# Patient Record
Sex: MALE | Race: WHITE | NOT HISPANIC OR LATINO | Employment: FULL TIME | URBAN - METROPOLITAN AREA
[De-identification: names, ages, dates, MRNs, and addresses within clinical notes are randomized per-mention and may not be internally consistent; named-entity substitution may affect disease eponyms.]

---

## 2022-12-11 ASSESSMENT — PATIENT HEALTH QUESTIONNAIRE - PHQ9
SUM OF ALL RESPONSES TO PHQ QUESTIONS 1-9: 12
SUM OF ALL RESPONSES TO PHQ QUESTIONS 1-9: 12
10. IF YOU CHECKED OFF ANY PROBLEMS, HOW DIFFICULT HAVE THESE PROBLEMS MADE IT FOR YOU TO DO YOUR WORK, TAKE CARE OF THINGS AT HOME, OR GET ALONG WITH OTHER PEOPLE: NOT DIFFICULT AT ALL

## 2022-12-12 ENCOUNTER — OFFICE VISIT (OUTPATIENT)
Dept: FAMILY MEDICINE | Facility: CLINIC | Age: 37
End: 2022-12-12
Payer: COMMERCIAL

## 2022-12-12 VITALS
OXYGEN SATURATION: 99 % | BODY MASS INDEX: 40.93 KG/M2 | WEIGHT: 260.8 LBS | HEART RATE: 57 BPM | DIASTOLIC BLOOD PRESSURE: 83 MMHG | SYSTOLIC BLOOD PRESSURE: 130 MMHG | HEIGHT: 67 IN | RESPIRATION RATE: 14 BRPM | TEMPERATURE: 97.5 F

## 2022-12-12 DIAGNOSIS — R20.0 NUMBNESS OF LEFT FOOT: ICD-10-CM

## 2022-12-12 DIAGNOSIS — L40.9 PSORIASIS: ICD-10-CM

## 2022-12-12 DIAGNOSIS — Z13.1 SCREENING FOR DIABETES MELLITUS: ICD-10-CM

## 2022-12-12 DIAGNOSIS — E78.5 HYPERLIPIDEMIA LDL GOAL <100: ICD-10-CM

## 2022-12-12 DIAGNOSIS — F32.1 CURRENT MODERATE EPISODE OF MAJOR DEPRESSIVE DISORDER WITHOUT PRIOR EPISODE (H): ICD-10-CM

## 2022-12-12 DIAGNOSIS — E66.01 MORBID OBESITY (H): ICD-10-CM

## 2022-12-12 DIAGNOSIS — Z00.00 ROUTINE GENERAL MEDICAL EXAMINATION AT A HEALTH CARE FACILITY: Primary | ICD-10-CM

## 2022-12-12 PROBLEM — M54.9 BACKACHE: Status: RESOLVED | Noted: 2019-10-17 | Resolved: 2022-12-12

## 2022-12-12 PROBLEM — N52.9 ERECTILE DYSFUNCTION: Status: ACTIVE | Noted: 2022-12-12

## 2022-12-12 PROBLEM — F32.9 MAJOR DEPRESSION: Status: ACTIVE | Noted: 2022-12-12

## 2022-12-12 PROBLEM — M54.16 LUMBAR RADICULOPATHY: Status: ACTIVE | Noted: 2019-11-08

## 2022-12-12 PROBLEM — N52.9 ERECTILE DYSFUNCTION: Status: RESOLVED | Noted: 2022-12-12 | Resolved: 2022-12-12

## 2022-12-12 PROBLEM — M54.9 BACKACHE: Status: ACTIVE | Noted: 2019-10-17

## 2022-12-12 LAB
CHOLEST SERPL-MCNC: 284 MG/DL
FASTING STATUS PATIENT QL REPORTED: YES
GLUCOSE SERPL-MCNC: 85 MG/DL (ref 70–99)
HDLC SERPL-MCNC: 40 MG/DL
LDLC SERPL CALC-MCNC: 220 MG/DL
NONHDLC SERPL-MCNC: 244 MG/DL
TRIGL SERPL-MCNC: 121 MG/DL

## 2022-12-12 PROCEDURE — 90686 IIV4 VACC NO PRSV 0.5 ML IM: CPT | Performed by: FAMILY MEDICINE

## 2022-12-12 PROCEDURE — 90471 IMMUNIZATION ADMIN: CPT | Performed by: FAMILY MEDICINE

## 2022-12-12 PROCEDURE — 90472 IMMUNIZATION ADMIN EACH ADD: CPT | Performed by: FAMILY MEDICINE

## 2022-12-12 PROCEDURE — 90715 TDAP VACCINE 7 YRS/> IM: CPT | Performed by: FAMILY MEDICINE

## 2022-12-12 PROCEDURE — 99385 PREV VISIT NEW AGE 18-39: CPT | Mod: 25 | Performed by: FAMILY MEDICINE

## 2022-12-12 PROCEDURE — 82947 ASSAY GLUCOSE BLOOD QUANT: CPT | Performed by: FAMILY MEDICINE

## 2022-12-12 PROCEDURE — 36415 COLL VENOUS BLD VENIPUNCTURE: CPT | Performed by: FAMILY MEDICINE

## 2022-12-12 PROCEDURE — 80061 LIPID PANEL: CPT | Performed by: FAMILY MEDICINE

## 2022-12-12 RX ORDER — GABAPENTIN 300 MG/1
300 CAPSULE ORAL 3 TIMES DAILY
COMMUNITY
Start: 2022-01-06 | End: 2022-12-12

## 2022-12-12 RX ORDER — CLONIDINE HYDROCHLORIDE 0.1 MG/1
0.1 TABLET ORAL 2 TIMES DAILY
COMMUNITY
Start: 2022-11-30

## 2022-12-12 RX ORDER — CLOBETASOL PROPIONATE 0.5 MG/G
OINTMENT TOPICAL
COMMUNITY
Start: 2022-04-18 | End: 2022-12-12

## 2022-12-12 RX ORDER — SERTRALINE HYDROCHLORIDE 100 MG/1
100 TABLET, FILM COATED ORAL DAILY
COMMUNITY
Start: 2022-11-30 | End: 2023-07-19

## 2022-12-12 RX ORDER — CLOBETASOL PROPIONATE 0.5 MG/G
OINTMENT TOPICAL
Qty: 60 G | Refills: 11 | Status: SHIPPED | OUTPATIENT
Start: 2022-12-12 | End: 2023-12-21

## 2022-12-12 ASSESSMENT — PATIENT HEALTH QUESTIONNAIRE - PHQ9
SUM OF ALL RESPONSES TO PHQ QUESTIONS 1-9: 12
10. IF YOU CHECKED OFF ANY PROBLEMS, HOW DIFFICULT HAVE THESE PROBLEMS MADE IT FOR YOU TO DO YOUR WORK, TAKE CARE OF THINGS AT HOME, OR GET ALONG WITH OTHER PEOPLE: NOT DIFFICULT AT ALL

## 2022-12-12 NOTE — PROGRESS NOTES
"Assessment & Plan   Screening for HIV (human immunodeficiency virus)  ***    Need for hepatitis C screening test  ***    Screening for hyperlipidemia  ***      BMI:   Estimated body mass index is 40.85 kg/m  as calculated from the following:    Height as of this encounter: 1.702 m (5' 7\").    Weight as of this encounter: 118.3 kg (260 lb 12.8 oz).   {Weight Management Plan needed for ACO:391698}    Depression Screening Follow Up    PHQ 12/11/2022   PHQ-9 Total Score 12   Q9: Thoughts of better off dead/self-harm past 2 weeks Not at all     {Last PHQ9 Response (Optional):619294}    Follow Up Actions Taken  {Positive screening follow up actions:778677::\"Crisis resource information provided in After Visit Summary\"}       No follow-ups on file.      Lenin Lr MD      [address] [address]  [email or web address]   Office: [phone]         Lamont Briscoe is a 37 year old, presenting for the following health issues:  Establish Care, RECHECK (From spinal surgery last year.), and Recheck Medication      History of Present Illness       Reason for visit:  Establish care + follow up from spine surgery    He eats 0-1 servings of fruits and vegetables daily.He consumes 0 sweetened beverage(s) daily.He exercises with enough effort to increase his heart rate 9 or less minutes per day.  He exercises with enough effort to increase his heart rate 3 or less days per week.   He is taking medications regularly.    Today's PHQ-9         PHQ-9 Total Score: 12    PHQ-9 Q9 Thoughts of better off dead/self-harm past 2 weeks :   Not at all    How difficult have these problems made it for you to do your work, take care of things at home, or get along with other people: Not difficult at all     Review of Systems   Constitutional, HEENT, cardiovascular, pulmonary, gi and gu systems are negative, except as otherwise noted.      Objective    /83   Pulse 57   Temp 97.5  F (36.4  C) " "(Tympanic)   Resp 14   Ht 1.702 m (5' 7\")   Wt 118.3 kg (260 lb 12.8 oz)   SpO2 99%   BMI 40.85 kg/m    Body mass index is 40.85 kg/m .  Physical Exam   GENERAL: healthy, alert and no distress  HENT: ear canals and TM's normal, nose and mouth without ulcers or lesions  NECK: no adenopathy, no asymmetry, masses, or scars and thyroid normal to palpation  RESP: lungs clear to auscultation - no rales, rhonchi or wheezes  CV: regular rate and rhythm, normal S1 S2, no S3 or S4, no murmur, click or rub, no peripheral edema and peripheral pulses strong  ABDOMEN: soft, nontender, no hepatosplenomegaly, no masses and bowel sounds normal  MS: no gross musculoskeletal defects noted, no edema  SKIN: no suspicious lesions or rashes  NEURO: Normal strength and tone, mentation intact and speech normal  BACK: no CVA tenderness, no paralumbar tenderness  PSYCH: mentation appears normal, affect normal/bright  LYMPH: no cervical, supraclavicular, axillary, or inguinal adenopathy                  "

## 2022-12-12 NOTE — PROGRESS NOTES
SUBJECTIVE:   CC: Juan Burleson is an 37 year old male who presents for preventive health visit.     Patient has been advised of split billing requirements and indicates understanding: Yes  History of Present Illness       Reason for visit:  Establish care + follow up from spine surgery    He eats 0-1 servings of fruits and vegetables daily.He consumes 0 sweetened beverage(s) daily.He exercises with enough effort to increase his heart rate 9 or less minutes per day.  He exercises with enough effort to increase his heart rate 3 or less days per week.   He is taking medications regularly.    Today's PHQ-9         PHQ-9 Total Score: 12    PHQ-9 Q9 Thoughts of better off dead/self-harm past 2 weeks :   Not at all    How difficult have these problems made it for you to do your work, take care of things at home, or get along with other people: Not difficult at all      Healthy Habits:    Do you get at least three servings of calcium containing foods daily (dairy, green leafy vegetables, etc.)? yes    Amount of exercise or daily activities, outside of work: 7 day(s) per week - dog walking    Problems taking medications regularly No    Medication side effects: No    Have you had an eye exam in the past two years? no    Do you see a dentist twice per year? yes    Do you have sleep apnea, excessive snoring or daytime drowsiness?no      Depression -  Sees psych    psoriasis on legs. Need oint     Today's PHQ-2 Score:   PHQ-2 ( 1999 [employer]) 12/11/2022   Q1: Little interest or pleasure in doing things 3   Q2: Feeling down, depressed or hopeless 3   PHQ-2 Score 6   Q1: Little interest or pleasure in doing things Nearly every day   Q2: Feeling down, depressed or hopeless Nearly every day   PHQ-2 Score 6       Abuse: Current or Past(Physical, Sexual or Emotional)- No  Do you feel safe in your environment? Yes        Social History     Tobacco Use     Smoking status: Never     Smokeless tobacco: Never   Substance Use Topics      "Alcohol use: Yes     Comment: once or twice a month - have a drink when out to dinner with     If you drink alcohol do you typically have >3 drinks per day or >7 drinks per week? No                      Reviewed orders with patient. Reviewed health maintenance and updated orders accordingly - Yes  Reviewed and updated as needed this visit by clinical staff   Tobacco  Allergies  Meds  Problems  Med Hx  Surg Hx  Fam Hx          Reviewed and updated as needed this visit by Provider   Tobacco  Allergies  Meds  Problems  Med Hx  Surg Hx  Fam Hx           ROS:  Review of Systems   Constitutional, HEENT, cardiovascular, pulmonary, GI, , musculoskeletal, neuro, skin, endocrine and psych systems are negative, except as otherwise noted.  OBJECTIVE:   /83   Pulse 57   Temp 97.5  F (36.4  C) (Tympanic)   Resp 14   Ht 1.702 m (5' 7\")   Wt 118.3 kg (260 lb 12.8 oz)   SpO2 99%   BMI 40.85 kg/m    EXAM:  GENERAL: healthy, alert and no distress  EYES: Eyes grossly normal to inspection, PERRL and conjunctivae and sclerae normal  HENT: ear canals and TM's normal, nose and mouth without ulcers or lesions  NECK: no adenopathy, no asymmetry, masses, or scars and thyroid normal to palpation  RESP: lungs clear to auscultation - no rales, rhonchi or wheezes  BREAST: normal without masses, tenderness or nipple discharge and no palpable axillary masses or adenopathy  CV: regular rate and rhythm, normal S1 S2, no S3 or S4, no murmur, click or rub, no peripheral edema and peripheral pulses strong  ABDOMEN: soft, nontender, no hepatosplenomegaly, no masses and bowel sounds normal   (male): normal male genitalia without lesions or urethral discharge, no hernia  MS: no gross musculoskeletal defects noted, no edema  SKIN: no suspicious lesions or rashes  NEURO: Normal strength and tone, mentation intact and speech normal  PSYCH: mentation appears normal, affect normal/bright  LYMPH: no cervical, supraclavicular, " "axillary, or inguinal adenopathy    ASSESSMENT/PLAN:   Routine general medical examination at a health care facility      Psoriasis  Ongoing symptoms, medication helpful and needs refill:  - clobetasol (TEMOVATE) 0.05 % external ointment  Dispense: 60 g; Refill: 11    Current moderate episode of major depressive disorder without prior episode (H)  Controlled - continue medication(s).    Morbid obesity (H)  Healthy diet and exercise    Screening for diabetes mellitus  - Glucose  - Glucose    Numbness of left foot  Bothersome symptoms and will refer to physical therapy and spine due to persistence.  - Spine  Referral  - Physical Therapy Referral    Hyperlipidemia LDL goal <100  The above noted, diet and exercise recommended recheck in 3 months.  - Lipid panel reflex to direct LDL Fasting  - Lipid panel reflex to direct LDL Non-fasting  - Lipid panel reflex to direct LDL Non-fasti    COUNSELING:  Reviewed preventive health counseling, as reflected in patient instructions    Estimated body mass index is 40.85 kg/m  as calculated from the following:    Height as of this encounter: 1.702 m (5' 7\").    Weight as of this encounter: 118.3 kg (260 lb 12.8 oz).  Weight management plan: Discussed healthy diet and exercise guidelines     reports that he has never smoked. He has never used smokeless tobacco.      Return in about 1 year (around 12/12/2023) for wellness exam with fasting labs with Lenin Lr MD.           Lenin Lr MD     [address] [address]  [email or web address]     Office: [phone]     "

## 2022-12-15 PROBLEM — E78.5 HYPERLIPIDEMIA LDL GOAL <100: Status: ACTIVE | Noted: 2022-12-15

## 2022-12-15 NOTE — RESULT ENCOUNTER NOTE
Dear Luis Felipe,    Here is a summary of your recent test results:  -LDL(bad) cholesterol level is very elevated which can increase your heart disease risk.  A diet high in fat and simple carbohydrates, genetics and being overweight can contribute to this. ADVISE: exercising 150 minutes of aerobic exercise per week (30 minutes for 5 days per week or 50 minutes for 3 days per week are options) and eating a low saturated fat/low carbohydrate diet are helpful to improve this. In 3 months, you should recheck your fasting cholesterol panel by scheduling a lab-only appointment.  -Glucose (diabetic screening test) is normal.    For additional lab test information, [email or web address] is a very good reference.           Thank you very much for trusting me and [facility].     Have a peaceful day.    Healthy regards,  Lenin Lr MD

## 2023-01-05 ENCOUNTER — THERAPY VISIT (OUTPATIENT)
Dept: PHYSICAL THERAPY | Facility: CLINIC | Age: 38
End: 2023-01-05
Attending: FAMILY MEDICINE
Payer: COMMERCIAL

## 2023-01-05 DIAGNOSIS — R20.0 NUMBNESS OF LEFT FOOT: ICD-10-CM

## 2023-01-05 DIAGNOSIS — M99.05 SOMATIC DYSFUNCTION OF PELVIC REGION: ICD-10-CM

## 2023-01-05 PROCEDURE — 97161 PT EVAL LOW COMPLEX 20 MIN: CPT | Mod: GP | Performed by: PHYSICAL THERAPIST

## 2023-01-05 PROCEDURE — 97140 MANUAL THERAPY 1/> REGIONS: CPT | Mod: GP | Performed by: PHYSICAL THERAPIST

## 2023-01-05 PROCEDURE — 97110 THERAPEUTIC EXERCISES: CPT | Mod: GP | Performed by: PHYSICAL THERAPIST

## 2023-01-05 NOTE — PROGRESS NOTES
Physical Therapy Initial Evaluation  Subjective:    Patient Health History  Juan Burleson being seen for hip hike and rotation on left side causing some issues up/down chain. Cannot feel parts of left foot..       Problem occurred: Original injury was in 2011, slight tear of right QL, lead to left side becoming dominant.  Got worse last year and contributed to herniated disc.   Pain is reported as 1/10 on pain scale.  General health as reported by patient is fair.  Pertinent medical history includes: high blood pressure, mental illness, numbness/tingling and overweight.     Medical allergies: none.   Surgeries include:  Orthopedic surgery.    Current medications:  Anti-depressants and other. Other medications details: topical steroid for psoriasis.    Current occupation is .   Primary job tasks include:  Computer work and prolonged sitting.                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 Pt presents with pain, decreased ROM, and instability consistent with pelvic dysfunction and generalized hypermobility. These impairments limit their ability to sit, and walk. Skilled PT services necessary in order to reduce impairments and improve independent function.    Subjective:   Chief Complaint: Pt notes 10 years ago was powerlifting (deadlift) and tore quadratus lumborum, notes left sided dysfunction (pelvis). Did have lumbar disectomy L5-S1 (2022)  DOI/onset: 1/1/2011 DOS: L5-S1 disectomy   Location: left sided pelvis/hip Quality: pinch/ sharp  Frequency: intermittent  Radiates: left sided foot numbness   Pain scale: Rest 0/10 Activity 8/10    Sleeping: occasionally wakes in pain    Exacerbated by: sit, walk  Relieved by: self correction/manip  Progression: same  Previous  Treatment: PT, chiro, massage  Effect of prior treatment: minimal help    PMH and/or surgical history: none    Imaging: Xrays, MRI    Occupation: [employer]  Job duties: computer, sitting     Current HEP/exercise regimen: none currently (use to be competitive power lifting)  Patient's goals: correct pelvis alignment  Medications: please refer to chart   General health as reported by patient: good   Return to MD: as needed  Red Flags: generalized hypermobility                     Objective:  LUMBAR:    Posture: shifted to right in sitting, rounded low back    Neurological:    Motor Deficit:  Myotomes L R   L1-2 (hip flexion) 5/5 5/5   L3 (knee extension) 5/5 5/5   L4 (ankle DF) 5/5 5/5   L5 (g. toe ext) 5/5 5/5   S1 (ankle PF or knee flex) 5/5 5/5     Sensory Deficit, Reflexes: lacking sensation on L5 on left (lateral foot)    Dural Signs:   L R   Slump + -       AROM: (Major, Moderate, Minimal or Nil loss)  Movement Loss Abel Mod Min Nil Pain   Flexion    X No pain   Extension    X Left side end range pinch   Side Gliding L    X End range pinch on left low back   Side Gliding R    X        Other Tests: ASIS on right side superior compared to left  MET:  Left sided glute activation to pull down L pelvis... significant improvement follow MET technique today.    System    Physical Exam    General     ROS    Assessment/Plan:    Patient is a 37 year old male with lumbar/ foot complaints.    Patient has the following significant findings with corresponding treatment plan.                Diagnosis 1:  L pelvic dysfunction  Pain -  hot/cold therapy, manual therapy, self management, education, directional preference exercise and home program  Decreased ROM/flexibility - manual therapy and therapeutic exercise  Decreased joint mobility - manual therapy and therapeutic exercise  Decreased strength - therapeutic exercise and therapeutic activities  Inflammation - cold therapy and self management/home program  Impaired gait - gait  training  Impaired muscle performance - neuro re-education  Decreased function - therapeutic activities  Impaired posture - neuro re-education  Instability -  Therapeutic Activity  Therapeutic Exercise    Therapy Evaluation Codes:     Cumulative Therapy Evaluation is: Low complexity.    Previous and current functional limitations:  (See Goal Flow Sheet for this information)    Short term and Long term goals: (See Goal Flow Sheet for this information)     Communication ability:  Patient appears to be able to clearly communicate and understand verbal and written communication and follow directions correctly.  Treatment Explanation - The following has been discussed with the patient:   RX ordered/plan of care  Anticipated outcomes  Possible risks and side effects  This patient would benefit from PT intervention to resume normal activities.   Rehab potential is good.    Frequency:  1 X week, once daily  Duration:  for 6 weeks  Discharge Plan:  Achieve all LTG.  Independent in home treatment program.  Reach maximal therapeutic benefit.    Please refer to the daily flowsheet for treatment today, total treatment time and time spent performing 1:1 timed codes.

## 2023-01-09 ENCOUNTER — OFFICE VISIT (OUTPATIENT)
Dept: NEUROSURGERY | Facility: CLINIC | Age: 38
End: 2023-01-09
Attending: FAMILY MEDICINE
Payer: COMMERCIAL

## 2023-01-09 VITALS
BODY MASS INDEX: 40.93 KG/M2 | HEIGHT: 67 IN | HEART RATE: 73 BPM | OXYGEN SATURATION: 99 % | DIASTOLIC BLOOD PRESSURE: 84 MMHG | SYSTOLIC BLOOD PRESSURE: 130 MMHG | WEIGHT: 260.8 LBS

## 2023-01-09 DIAGNOSIS — R20.0 NUMBNESS OF LEFT FOOT: ICD-10-CM

## 2023-01-09 PROCEDURE — 99203 OFFICE O/P NEW LOW 30 MIN: CPT | Performed by: PHYSICIAN ASSISTANT

## 2023-01-09 PROCEDURE — 99212 OFFICE O/P EST SF 10 MIN: CPT | Performed by: PHYSICIAN ASSISTANT

## 2023-01-09 PROCEDURE — G0463 HOSPITAL OUTPT CLINIC VISIT: HCPCS

## 2023-01-09 RX ORDER — BUPROPION HYDROCHLORIDE 150 MG/1
TABLET ORAL
COMMUNITY
Start: 2022-12-16 | End: 2023-07-19

## 2023-01-09 ASSESSMENT — PAIN SCALES - GENERAL: PAINLEVEL: NO PAIN (0)

## 2023-01-09 NOTE — PROGRESS NOTES
NEUROSURGERY CLINIC CONSULT NOTE     DATE OF VISIT: 1/9/2023     SUBJECTIVE:     Juan Burleson is a pleasant 37 year old male who presents to the clinic today for consultation on lumbar spine pain with left leg symptoms. He is referred to the Neurosurgery Clinic by Dr. Lr in Primary Care.     Pertinent medical history consists of a L5-S1 laminotomy performed in [place] approximately one year ago.     Today, he describes a daily with fluctuating intensity, sharp, aching, burning pain that initiates in the misline low lumbar region and radiates distally in what sounds like the left S1 distribution. This pain is accompanied by paresthesia and numbness but not weakness. Leg extension and hip flexion seems to aggravate the symptoms, while alleviation is obtained by stretching and positional changes. No mechanism of injury such as trauma or a fall is associated with the onset of the pain. There are no bowel or bladder changes. He denies saddle anesthesia. He denies changes in gait, instability, or falling episodes. He has participated in any recent conservative therapies.       Current Outpatient Medications:      clobetasol (TEMOVATE) 0.05 % external ointment, Apply twice daily for 2 weeks, then twice daily on weekends for 2 weeks. Repeat., Disp: 60 g, Rfl: 11     cloNIDine (CATAPRES) 0.1 MG tablet, TAKE 1 TABLET BY MOUTH AT BEDTIME FOR 7 DAYS. INCREASE TO 1 TABLET BY MOUTH 2 TIMES DAILY, Disp: , Rfl:      buPROPion (WELLBUTRIN XL) 150 MG 24 hr tablet, TAKE 1 TABLET BY MOUTH EVERY MORNING FOR 7 DAYS THEN INCREASE  MG BY MOUTH EVERY DAY, Disp: , Rfl:      sertraline (ZOLOFT) 100 MG tablet, Take 100 mg by mouth daily, Disp: , Rfl:      No Known Allergies     Past Medical History:   Diagnosis Date     Depressive disorder     started when I was a teen        ROS: 10 point review of symptoms are negative other than the symptoms noted above in the HPI.     Family History has been reviewed with the patient, there  "are no pertinent findings to presenting concern.     Past Surgical History:   Procedure Laterality Date     ABDOMEN SURGERY  2011    Skin removal after weigth loss 2011     BACK SURGERY  12/2021    Laproscopic discecomy        Social History     Tobacco Use     Smoking status: Never     Smokeless tobacco: Never   Vaping Use     Vaping Use: Never used   Substance Use Topics     Alcohol use: Yes     Comment: once or twice a month - have a drink when out to dinner with     Drug use: Never        OBJECTIVE:   /84   Pulse 73   Ht 5' 7\" (1.702 m)   Wt 260 lb 12.8 oz (118.3 kg)   SpO2 99%   BMI 40.85 kg/m     Body mass index is 40.85 kg/m .     Imaging:     MR SPINE LUMBAR WO AND W CONTRAST, 3/3/2022 8:05 PM      Findings, per radiologist read, notable for:      1.  Evidence of interval left L5 laminotomy and L5-S1 discectomy. There   continues to be disc extrusion extending into the left L5-S1 neural   foramen causing moderate stenosis and slight deformity of the exiting   left L5 nerve.     2.  Additional degenerative changes at other levels are at most mild, as   described in the findings.    Full radiological report in chart. Imaging was reviewed with with patient today.     Exam:     Patient appears comfortable, conversational, and in no apparent distress.   Head: Normocephalic, without obvious abnormality, atraumatic, no facial asymmetry.   Eyes: conjunctivae/corneas clear. PERRL, EOM's intact.   Throat: lips, mucosa, and tongue normal; teeth and gums normal.   Neck: supple, symmetrical, trachea midline, no adenopathy and thyroid: not enlarged, symmetric, no tenderness/mass/nodules.   Lungs: clear to auscultation bilaterally.   Heart: regular rate and rhythm.   Abdomen: soft, non-tender; bowel sounds normal; no masses, no organomegaly.   Pulses: 2+ and symmetric.   Skin: Skin color, texture, turgor normal. No rashes or lesions.     CN II-XII grossly intact, alert and appropriate with conversation and " following commands.   Gait is non-antalgic. Able to tandem walk. Able to walk on toes and heels without difficulty.   Cervical spine is non tender to palpation. Appropriate range of motion of neck, not concerning for lhermitte's phenomenon.   Bilateral bicep 2/4 and tricep reflexes 1/4. Sensation intact throughout upper extremities.     UE muscle strength  Right  Left    Deltoid  5/5  5/5    Biceps  5/5  5/5    Triceps  5/5  5/5    Hand intrinsics  5/5  5/5    Hand grasp  5/5  5/5    Carl signs  neg  neg      Lumbar spine is non tender to palpation.  Diminished sensation throughout left lower extremities.  Bilateral patellar 1/4 and achilles reflex 1/4. Negative for pain with straight leg raise.     LE muscle strength  Right  Left    Iliopsoas (hip flexion)  5/5  5/5    Quad (knee extension)  5/5  5/5    Hamstring (knee flexion)  5/5  5/5    Gastrocnemius (PF)  5/5  5/5    Tibialis Ant. (DF)  5/5  5/5    EHL  5/5  5/5      Negative Babinski bilaterally. Negative for clonus.   Calves are soft and non-tender bilaterally.       ASSESSMENT/PLAN:     Juan Burleson is a 37 year old male who presents to the clinic for consultation on a daily with fluctuating intensity, sharp, aching, burning pain that initiates in the misline low lumbar region and radiates distally in what sounds like the left S1 distribution. This pain is accompanied by paresthesia and numbness but not weakness. The patient's most recent imaging was reviewed with him today. It was explained that images show disc extrusion extending into the left L5-S1 neural foramen causing moderate stenosis and slight deformity of the exiting left L5 nerve, which correlates to today's clinical examination. On exam, he is noted to have appropriate strength, but diminished left lower extremity sensation.     Mr. Burleson did have a L5-S1 laminotomy performed in [place] approximately one year ago and is not interested in surgical intervention at this time.    Based on  his physical exam, imaging review and past treatments, we feel that it would be in his best interest to try a conservative approach by participating in a program initiated by our colleagues at the [facility]. He did inquire about possible treatment options that they may consider so we briefly discussed core stretching and strengthening exercises in conjunction with lumbar traction as possibilities. If the traction proves to provide alleviation, it would be appropriate to issue a home device.     We also discussed the possibility of obtaining an epidural steroid injection or a MDP, but he would to avoid these options for now.    We would like to see him back as needed to further discuss other conservative options or possible surgical interventions.      We did review the images together and we explained his condition and the recommended treatment. We also discussed signs of a worsening problem that he should seek being evaluated.        Respectfully,     NATHALY Rea, PA-NAYELI  [facility]  [facility]  Tel: [phone]      Exam, imaging, and plan reviewed by Dr. Martinez.

## 2023-01-09 NOTE — NURSING NOTE
"Juan Burleson is a 37 year old male who presents for:  Chief Complaint   Patient presents with     Consult     Left foot numbness Lumbar radiculopathy         Initial Vitals:  /84   Pulse 73   Ht 5' 7\" (1.702 m)   Wt 260 lb 12.8 oz (118.3 kg)   SpO2 99%   BMI 40.85 kg/m   Estimated body mass index is 40.85 kg/m  as calculated from the following:    Height as of this encounter: 5' 7\" (1.702 m).    Weight as of this encounter: 260 lb 12.8 oz (118.3 kg).. Body surface area is 2.36 meters squared. BP completed using cuff size: regular  No Pain (0)        Yessi Velez  "

## 2023-01-09 NOTE — LETTER
1/9/2023         RE: Juan Burleson  [address]  [place] [address]        Dear Colleague,    Thank you for referring your patient, Juan Burleson, to the [facility]UROS[facility]GERY [facility] [place]. Please see a copy of my visit note below.    NEUROSURGERY CLINIC CONSULT NOTE     DATE OF VISIT: 1/9/2023     SUBJECTIVE:     Juan Burleson is a pleasant 37 year old male who presents to the clinic today for consultation on lumbar spine pain with left leg symptoms. He is referred to the Neurosurgery Clinic by Dr. Lr in Primary Care.     Pertinent medical history consists of a L5-S1 laminotomy performed in [place] approximately one year ago.     Today, he describes a daily with fluctuating intensity, sharp, aching, burning pain that initiates in the misline low lumbar region and radiates distally in what sounds like the left S1 distribution. This pain is accompanied by paresthesia and numbness but not weakness. Leg extension and hip flexion seems to aggravate the symptoms, while alleviation is obtained by stretching and positional changes. No mechanism of injury such as trauma or a fall is associated with the onset of the pain. There are no bowel or bladder changes. He denies saddle anesthesia. He denies changes in gait, instability, or falling episodes. He has participated in any recent conservative therapies.       Current Outpatient Medications:      clobetasol (TEMOVATE) 0.05 % external ointment, Apply twice daily for 2 weeks, then twice daily on weekends for 2 weeks. Repeat., Disp: 60 g, Rfl: 11     cloNIDine (CATAPRES) 0.1 MG tablet, TAKE 1 TABLET BY MOUTH AT BEDTIME FOR 7 DAYS. INCREASE TO 1 TABLET BY MOUTH 2 TIMES DAILY, Disp: , Rfl:      buPROPion (WELLBUTRIN XL) 150 MG 24 hr tablet, TAKE 1 TABLET BY MOUTH EVERY MORNING FOR 7 DAYS THEN INCREASE  MG BY MOUTH EVERY DAY, Disp: , Rfl:      sertraline (ZOLOFT) 100 MG tablet, Take 100 mg by mouth daily, Disp: , Rfl:      No Known  "Allergies     Past Medical History:   Diagnosis Date     Depressive disorder     started when I was a teen        ROS: 10 point review of symptoms are negative other than the symptoms noted above in the HPI.     Family History has been reviewed with the patient, there are no pertinent findings to presenting concern.     Past Surgical History:   Procedure Laterality Date     ABDOMEN SURGERY  2011    Skin removal after weigth loss 2011     BACK SURGERY  12/2021    Laproscopic discecomy        Social History     Tobacco Use     Smoking status: Never     Smokeless tobacco: Never   Vaping Use     Vaping Use: Never used   Substance Use Topics     Alcohol use: Yes     Comment: once or twice a month - have a drink when out to dinner with     Drug use: Never        OBJECTIVE:   /84   Pulse 73   Ht 5' 7\" (1.702 m)   Wt 260 lb 12.8 oz (118.3 kg)   SpO2 99%   BMI 40.85 kg/m     Body mass index is 40.85 kg/m .     Imaging:     MR SPINE LUMBAR WO AND W CONTRAST, 3/3/2022 8:05 PM      Findings, per radiologist read, notable for:      1.  Evidence of interval left L5 laminotomy and L5-S1 discectomy. There   continues to be disc extrusion extending into the left L5-S1 neural   foramen causing moderate stenosis and slight deformity of the exiting   left L5 nerve.     2.  Additional degenerative changes at other levels are at most mild, as   described in the findings.    Full radiological report in chart. Imaging was reviewed with with patient today.     Exam:     Patient appears comfortable, conversational, and in no apparent distress.   Head: Normocephalic, without obvious abnormality, atraumatic, no facial asymmetry.   Eyes: conjunctivae/corneas clear. PERRL, EOM's intact.   Throat: lips, mucosa, and tongue normal; teeth and gums normal.   Neck: supple, symmetrical, trachea midline, no adenopathy and thyroid: not enlarged, symmetric, no tenderness/mass/nodules.   Lungs: clear to auscultation bilaterally.   Heart: regular " rate and rhythm.   Abdomen: soft, non-tender; bowel sounds normal; no masses, no organomegaly.   Pulses: 2+ and symmetric.   Skin: Skin color, texture, turgor normal. No rashes or lesions.     CN II-XII grossly intact, alert and appropriate with conversation and following commands.   Gait is non-antalgic. Able to tandem walk. Able to walk on toes and heels without difficulty.   Cervical spine is non tender to palpation. Appropriate range of motion of neck, not concerning for lhermitte's phenomenon.   Bilateral bicep 2/4 and tricep reflexes 1/4. Sensation intact throughout upper extremities.     UE muscle strength  Right  Left    Deltoid  5/5  5/5    Biceps  5/5  5/5    Triceps  5/5  5/5    Hand intrinsics  5/5  5/5    Hand grasp  5/5  5/5    Carl signs  neg  neg      Lumbar spine is non tender to palpation.  Diminished sensation throughout left lower extremities.  Bilateral patellar 1/4 and achilles reflex 1/4. Negative for pain with straight leg raise.     LE muscle strength  Right  Left    Iliopsoas (hip flexion)  5/5  5/5    Quad (knee extension)  5/5  5/5    Hamstring (knee flexion)  5/5  5/5    Gastrocnemius (PF)  5/5  5/5    Tibialis Ant. (DF)  5/5  5/5    EHL  5/5  5/5      Negative Babinski bilaterally. Negative for clonus.   Calves are soft and non-tender bilaterally.       ASSESSMENT/PLAN:     Juan Burleson is a 37 year old male who presents to the clinic for consultation on a daily with fluctuating intensity, sharp, aching, burning pain that initiates in the misline low lumbar region and radiates distally in what sounds like the left S1 distribution. This pain is accompanied by paresthesia and numbness but not weakness. The patient's most recent imaging was reviewed with him today. It was explained that images show disc extrusion extending into the left L5-S1 neural foramen causing moderate stenosis and slight deformity of the exiting left L5 nerve, which correlates to today's clinical examination.  On exam, he is noted to have appropriate strength, but diminished left lower extremity sensation.     Mr. Burleson did have a L5-S1 laminotomy performed in [place] approximately one year ago and is not interested in surgical intervention at this time.    Based on his physical exam, imaging review and past treatments, we feel that it would be in his best interest to try a conservative approach by participating in a program initiated by our colleagues at the [facility]. He did inquire about possible treatment options that they may consider so we briefly discussed core stretching and strengthening exercises in conjunction with lumbar traction as possibilities. If the traction proves to provide alleviation, it would be appropriate to issue a home device.     We also discussed the possibility of obtaining an epidural steroid injection or a MDP, but he would to avoid these options for now.    We would like to see him back as needed to further discuss other conservative options or possible surgical interventions.      We did review the images together and we explained his condition and the recommended treatment. We also discussed signs of a worsening problem that he should seek being evaluated.        Respectfully,     NATHALY Rea PA-C  [facility]  [facility]  Tel: [phone]      Exam, imaging, and plan reviewed by Dr. Martinez.       Again, thank you for allowing me to participate in the care of your patient.        Sincerely,        Marco Mancia PA-C

## 2023-01-12 ENCOUNTER — THERAPY VISIT (OUTPATIENT)
Dept: PHYSICAL THERAPY | Facility: CLINIC | Age: 38
End: 2023-01-12
Attending: FAMILY MEDICINE
Payer: COMMERCIAL

## 2023-01-12 DIAGNOSIS — M99.05 SOMATIC DYSFUNCTION OF PELVIC REGION: Primary | ICD-10-CM

## 2023-01-12 PROCEDURE — 97110 THERAPEUTIC EXERCISES: CPT | Mod: GP | Performed by: PHYSICAL THERAPIST

## 2023-01-12 PROCEDURE — 97112 NEUROMUSCULAR REEDUCATION: CPT | Mod: GP | Performed by: PHYSICAL THERAPIST

## 2023-01-12 PROCEDURE — 97140 MANUAL THERAPY 1/> REGIONS: CPT | Mod: GP | Performed by: PHYSICAL THERAPIST

## 2023-01-19 ENCOUNTER — THERAPY VISIT (OUTPATIENT)
Dept: PHYSICAL THERAPY | Facility: CLINIC | Age: 38
End: 2023-01-19
Payer: COMMERCIAL

## 2023-01-19 DIAGNOSIS — M99.05 SOMATIC DYSFUNCTION OF PELVIC REGION: Primary | ICD-10-CM

## 2023-01-19 PROCEDURE — 97110 THERAPEUTIC EXERCISES: CPT | Mod: GP | Performed by: PHYSICAL THERAPIST

## 2023-01-19 PROCEDURE — 97140 MANUAL THERAPY 1/> REGIONS: CPT | Mod: GP | Performed by: PHYSICAL THERAPIST

## 2023-02-02 ENCOUNTER — THERAPY VISIT (OUTPATIENT)
Dept: PHYSICAL THERAPY | Facility: CLINIC | Age: 38
End: 2023-02-02
Payer: COMMERCIAL

## 2023-02-02 DIAGNOSIS — M99.05 SOMATIC DYSFUNCTION OF PELVIC REGION: Primary | ICD-10-CM

## 2023-02-02 PROCEDURE — 97140 MANUAL THERAPY 1/> REGIONS: CPT | Mod: GP | Performed by: PHYSICAL THERAPIST

## 2023-02-02 PROCEDURE — 97110 THERAPEUTIC EXERCISES: CPT | Mod: GP | Performed by: PHYSICAL THERAPIST

## 2023-03-02 ENCOUNTER — THERAPY VISIT (OUTPATIENT)
Dept: PHYSICAL THERAPY | Facility: CLINIC | Age: 38
End: 2023-03-02
Payer: COMMERCIAL

## 2023-03-02 DIAGNOSIS — M99.05 SOMATIC DYSFUNCTION OF PELVIC REGION: Primary | ICD-10-CM

## 2023-03-02 PROCEDURE — 97110 THERAPEUTIC EXERCISES: CPT | Mod: GP | Performed by: PHYSICAL THERAPIST

## 2023-03-02 PROCEDURE — 97112 NEUROMUSCULAR REEDUCATION: CPT | Mod: GP | Performed by: PHYSICAL THERAPIST

## 2023-03-02 PROCEDURE — 97140 MANUAL THERAPY 1/> REGIONS: CPT | Mod: GP | Performed by: PHYSICAL THERAPIST

## 2023-03-23 ENCOUNTER — THERAPY VISIT (OUTPATIENT)
Dept: PHYSICAL THERAPY | Facility: CLINIC | Age: 38
End: 2023-03-23
Payer: COMMERCIAL

## 2023-03-23 DIAGNOSIS — M99.05 SOMATIC DYSFUNCTION OF PELVIC REGION: Primary | ICD-10-CM

## 2023-03-23 PROCEDURE — 97530 THERAPEUTIC ACTIVITIES: CPT | Mod: GP | Performed by: PHYSICAL THERAPIST

## 2023-03-23 PROCEDURE — 97110 THERAPEUTIC EXERCISES: CPT | Mod: GP | Performed by: PHYSICAL THERAPIST

## 2023-03-23 PROCEDURE — 97140 MANUAL THERAPY 1/> REGIONS: CPT | Mod: GP | Performed by: PHYSICAL THERAPIST

## 2023-03-23 NOTE — PROGRESS NOTES
Subjective:  HPI  Physical Exam  Oswestry Score: 13.33 %                 Objective:  System    Physical Exam    General     ROS    Assessment/Plan:    PROGRESS  REPORT    Progress reporting period is from  3/23/2023.       SUBJECTIVE  Subjective: Pt notes overall improvement. Has been wearing SI belt 90+% of time. Feels more symmetrical, but still feels a tightened muscle (QL/psoas) on left side. Home program is noted to be going well at home. Pt notes being 50% better since eval.     Current Pain level: 0/10.      Initial Pain level: 6/10.   Changes in function:  Yes,   Adverse reaction to treatment or activity: None    OBJECTIVE  Changes noted in objective findings:  Yes,   Objective: + slump on left. Psoas release on left. Overall pt is making improvements in pelvis symmetry; education on home program (core, glute strength)     ASSESSMENT/PLAN  Updated problem list and treatment plan: Diagnosis 1:  SIJ dysfunction and left LBP  Decreased strength - therapeutic exercise and therapeutic activities  Inflammation - cold therapy and self management/home program  Impaired gait - gait training  Impaired muscle performance - neuro re-education  Decreased function - therapeutic activities  Impaired posture - neuro re-education  Instability -  Therapeutic Activity  Therapeutic Exercise  STG/LTGs have been met or progress has been made towards goals:  Yes (See Goal flow sheet completed today.)  Assessment of Progress: The patient's condition is improving.  Self Management Plans:  Patient has been instructed in a home treatment program.  I have re-evaluated this patient and find that the nature, scope, duration and intensity of the therapy is appropriate for the medical condition of the patient.  Juan continues to require the following intervention to meet STG and LTG's:  PT    Recommendations:  This patient would benefit from continued therapy.     Frequency:  1 X a month, once daily  Duration:  for 3 months        Please  refer to the daily flowsheet for treatment today, total treatment time and time spent performing 1:1 timed codes.

## 2023-05-26 PROBLEM — M99.05 SOMATIC DYSFUNCTION OF PELVIC REGION: Status: RESOLVED | Noted: 2023-01-05 | Resolved: 2023-05-26

## 2023-06-20 ENCOUNTER — MYC MEDICAL ADVICE (OUTPATIENT)
Dept: FAMILY MEDICINE | Facility: CLINIC | Age: 38
End: 2023-06-20
Payer: COMMERCIAL

## 2023-07-19 ENCOUNTER — OFFICE VISIT (OUTPATIENT)
Dept: FAMILY MEDICINE | Facility: CLINIC | Age: 38
End: 2023-07-19
Payer: COMMERCIAL

## 2023-07-19 VITALS
SYSTOLIC BLOOD PRESSURE: 126 MMHG | TEMPERATURE: 97.9 F | HEART RATE: 76 BPM | RESPIRATION RATE: 15 BRPM | WEIGHT: 253 LBS | BODY MASS INDEX: 39.71 KG/M2 | HEIGHT: 67 IN | OXYGEN SATURATION: 99 % | DIASTOLIC BLOOD PRESSURE: 85 MMHG

## 2023-07-19 DIAGNOSIS — F90.9 ATTENTION DEFICIT HYPERACTIVITY DISORDER (ADHD), UNSPECIFIED ADHD TYPE: Primary | ICD-10-CM

## 2023-07-19 DIAGNOSIS — E78.5 HYPERLIPIDEMIA LDL GOAL <100: ICD-10-CM

## 2023-07-19 PROCEDURE — 99214 OFFICE O/P EST MOD 30 MIN: CPT | Performed by: INTERNAL MEDICINE

## 2023-07-19 PROCEDURE — 93000 ELECTROCARDIOGRAM COMPLETE: CPT | Performed by: INTERNAL MEDICINE

## 2023-07-19 RX ORDER — LISDEXAMFETAMINE DIMESYLATE 40 MG/1
40 CAPSULE ORAL EVERY MORNING
COMMUNITY
Start: 2023-07-06 | End: 2024-01-12 | Stop reason: DRUGHIGH

## 2023-07-19 RX ORDER — BUPROPION HYDROCHLORIDE 300 MG/1
300 TABLET ORAL EVERY MORNING
COMMUNITY
Start: 2022-12-16

## 2023-07-19 ASSESSMENT — PATIENT HEALTH QUESTIONNAIRE - PHQ9
SUM OF ALL RESPONSES TO PHQ QUESTIONS 1-9: 9
SUM OF ALL RESPONSES TO PHQ QUESTIONS 1-9: 9
10. IF YOU CHECKED OFF ANY PROBLEMS, HOW DIFFICULT HAVE THESE PROBLEMS MADE IT FOR YOU TO DO YOUR WORK, TAKE CARE OF THINGS AT HOME, OR GET ALONG WITH OTHER PEOPLE: NOT DIFFICULT AT ALL

## 2023-07-19 ASSESSMENT — PAIN SCALES - GENERAL: PAINLEVEL: NO PAIN (0)

## 2023-07-19 NOTE — PROGRESS NOTES
Lamont Briscoe is a 37 year old, presenting for the following health issues:  Recheck Medication         No data to display              History of Present Illness       Reason for visit:  EKG request from psychiatrist following elevated HR/BP event  Symptoms include:  Increased doage of vyvanse, HR/BP became elevated and would not return to normal for 2hrs - have not experienced it since  Symptom progression:  Improving  Had these symptoms before:  No    He eats 2-3 servings of fruits and vegetables daily.He consumes 1 sweetened beverage(s) daily.He exercises with enough effort to increase his heart rate 30 to 60 minutes per day.  He exercises with enough effort to increase his heart rate 7 days per week.   He is taking medications regularly.    Today's PHQ-9         PHQ-9 Total Score: 9    PHQ-9 Q9 Thoughts of better off dead/self-harm past 2 weeks :   Not at all    How difficult have these problems made it for you to do your work, take care of things at home, or get along with other people: Not difficult at all     Tachycardia   Juan Burleson was recently diagnosed with ADHD in January and started a new medication of Vyvanse starting in May at lower dose of 30 mg and dose was increased to 40 mg on June 7.  Shortly thereafter - 8 hours after second dose his heart rate peaked up to 95 beats per minute.  Unable to improve heart rate for about 2 hours.  Normally heart rate is in the 50's and will elevate to 100's if exercising and average is in the 60-70's.  He felt tense and felt blood pressure rising and shaky.  He did check his blood pressure and it was in the 140's systolic.  Blood pressure usually is around 123/73 on average this month.  Outside that event he has not had any further racing heart and blood pressure.  He notes that the medication has not been as effect as he would have liked and would like to consider titrating the dose higher.  He is not exercising much due to back injury 13 years ago.   He  "is doing home physical therapy to help with back injury.      Review of Systems   Constitutional, HEENT, cardiovascular, pulmonary, GI, , musculoskeletal - history of back injury and degenerative disc disease, neurology - left foot numbness chronic, skin - psoriasis, endocrine and psychiatry -mental health has been up and down struggle - stable systems are negative, except as otherwise noted.      Objective    /85 (BP Location: Right arm, Patient Position: Sitting, Cuff Size: Adult Large)   Pulse 76   Temp 97.9  F (36.6  C) (Tympanic)   Resp 15   Ht 1.702 m (5' 7\")   Wt 114.8 kg (253 lb)   SpO2 99%   BMI 39.63 kg/m    Body mass index is 39.63 kg/m .  Physical Exam   GENERAL: healthy, alert and no distress  EYES: Eyes grossly normal to inspection,    HENT: ear canals and TM's normal, nose and mouth without ulcers or lesions  NECK: no adenopathy, no asymmetry, masses   RESP: lungs clear to auscultation - no rales, rhonchi or wheezes  CV: regular rate and rhythm, normal S1 S2, no S3 or S4, no murmur   ABDOMEN: obese, soft, nontender, no hepatosplenomegaly   MS: no gross musculoskeletal defects noted, no edema  SKIN: no suspicious lesions or rashes  NEURO: Normal strength and tone, mentation intact and speech normal + psychomotor activity  PSYCH: mentation appears normal, affect normal/bright    Patient Instructions   (F90.9) Attention deficit hyperactivity disorder (ADHD), unspecified ADHD type  (primary encounter diagnosis)  Comment: We will check EKG today and schedule fasting labs later this week or next.  Noted that you are doing much better since acclimating to higher dose of Vyvanse and lower dose of bupropion.  Consider titrating medications appropriately depending on EKG and labs   Plan: EKG 12-lead complete w/read - Clinics, TSH with        free T4 reflex            (E78.5) Hyperlipidemia LDL goal <100  Comment: noted LDL was quite elevated and we will recheck at your convenience  Plan: " Comprehensive metabolic panel (BMP + Alb, Alk         Phos, ALT, AST, Total. Bili, TP), Lipid panel         reflex to direct LDL Fasting

## 2023-07-19 NOTE — PATIENT INSTRUCTIONS
(F90.9) Attention deficit hyperactivity disorder (ADHD), unspecified ADHD type  (primary encounter diagnosis)  Comment: We will check EKG today and schedule fasting labs later this week or next.  Noted that you are doing much better since acclimating to higher dose of Vyvanse and lower dose of bupropion.  Consider titrating medications appropriately depending on EKG and labs   Plan: EKG 12-lead complete w/read - Clinics, TSH with        free T4 reflex            (E78.5) Hyperlipidemia LDL goal <100  Comment: noted LDL was quite elevated and we will recheck at your convenience  Plan: Comprehensive metabolic panel (BMP + Alb, Alk         Phos, ALT, AST, Total. Bili, TP), Lipid panel         reflex to direct LDL Fasting

## 2023-07-24 ENCOUNTER — LAB (OUTPATIENT)
Dept: LAB | Facility: CLINIC | Age: 38
End: 2023-07-24
Payer: COMMERCIAL

## 2023-07-24 DIAGNOSIS — E78.5 HYPERLIPIDEMIA LDL GOAL <100: ICD-10-CM

## 2023-07-24 DIAGNOSIS — F90.9 ATTENTION DEFICIT HYPERACTIVITY DISORDER (ADHD), UNSPECIFIED ADHD TYPE: ICD-10-CM

## 2023-07-24 LAB
ALBUMIN SERPL BCG-MCNC: 4.4 G/DL (ref 3.5–5.2)
ALP SERPL-CCNC: 95 U/L (ref 40–129)
ALT SERPL W P-5'-P-CCNC: 21 U/L (ref 0–70)
ANION GAP SERPL CALCULATED.3IONS-SCNC: 12 MMOL/L (ref 7–15)
AST SERPL W P-5'-P-CCNC: 36 U/L (ref 0–45)
BILIRUB SERPL-MCNC: 0.3 MG/DL
BUN SERPL-MCNC: 11.2 MG/DL (ref 6–20)
CALCIUM SERPL-MCNC: 9.6 MG/DL (ref 8.6–10)
CHLORIDE SERPL-SCNC: 103 MMOL/L (ref 98–107)
CHOLEST SERPL-MCNC: 227 MG/DL
CREAT SERPL-MCNC: 1.16 MG/DL (ref 0.67–1.17)
DEPRECATED HCO3 PLAS-SCNC: 25 MMOL/L (ref 22–29)
GFR SERPL CREATININE-BSD FRML MDRD: 82 ML/MIN/1.73M2
GLUCOSE SERPL-MCNC: 94 MG/DL (ref 70–99)
HDLC SERPL-MCNC: 37 MG/DL
LDLC SERPL CALC-MCNC: 176 MG/DL
NONHDLC SERPL-MCNC: 190 MG/DL
POTASSIUM SERPL-SCNC: 4.1 MMOL/L (ref 3.4–5.3)
PROT SERPL-MCNC: 7.4 G/DL (ref 6.4–8.3)
SODIUM SERPL-SCNC: 140 MMOL/L (ref 136–145)
TRIGL SERPL-MCNC: 71 MG/DL
TSH SERPL DL<=0.005 MIU/L-ACNC: 3.59 UIU/ML (ref 0.3–4.2)

## 2023-07-24 PROCEDURE — 36415 COLL VENOUS BLD VENIPUNCTURE: CPT

## 2023-07-24 PROCEDURE — 80053 COMPREHEN METABOLIC PANEL: CPT

## 2023-07-24 PROCEDURE — 80061 LIPID PANEL: CPT

## 2023-07-24 PROCEDURE — 84443 ASSAY THYROID STIM HORMONE: CPT

## 2023-07-31 NOTE — RESULT ENCOUNTER NOTE
Siddharth Martinez,    I had the opportunity to review your recent labs and a summary of your labs reads as follows:    Your thyroid function returned stable  Your comprehensive metabolic panel showed normal renal function, normal liver function, and normal fasting blood glucose indicating no evidence of diabetes mellitus.  Your fasting lipid panel show  -Low HDL (good) cholesterol -as your goal is greater than 40  -Elevated, but improved LDL (bad) cholesterol as your goal is less than 160  - normal triglyceride levels    At this time, statin would not be recommended, however I would recommend to continue to monitor and recheck your cholesterol again at your upcoming physical.  In the interim, I would recommend a you look into the Mediterranean Diet - typically high in fruits, vegetables, whole grains, beans, nuts, and seeds; include olive oil as an important source of monounsaturated fat; and allow low to moderate wine consumption.  There are also typically low to moderate amounts of fish, poultry, and dairy products, with little red meat in this diet.    Additionally, we can consider CT coronary calcium scan at your next physical at age 38      Sincerely,  Josh Foster MD

## 2023-11-13 ENCOUNTER — PATIENT OUTREACH (OUTPATIENT)
Dept: CARE COORDINATION | Facility: CLINIC | Age: 38
End: 2023-11-13
Payer: COMMERCIAL

## 2023-11-27 ENCOUNTER — PATIENT OUTREACH (OUTPATIENT)
Dept: CARE COORDINATION | Facility: CLINIC | Age: 38
End: 2023-11-27
Payer: COMMERCIAL

## 2023-12-21 ENCOUNTER — MYC REFILL (OUTPATIENT)
Dept: FAMILY MEDICINE | Facility: CLINIC | Age: 38
End: 2023-12-21
Payer: COMMERCIAL

## 2023-12-21 DIAGNOSIS — L40.9 PSORIASIS: ICD-10-CM

## 2023-12-21 RX ORDER — CLOBETASOL PROPIONATE 0.5 MG/G
OINTMENT TOPICAL
Qty: 60 G | Refills: 11 | OUTPATIENT
Start: 2023-12-21

## 2023-12-21 RX ORDER — CLOBETASOL PROPIONATE 0.5 MG/G
OINTMENT TOPICAL
Qty: 60 G | Refills: 11 | Status: SHIPPED | OUTPATIENT
Start: 2023-12-21 | End: 2024-05-17

## 2024-01-08 ASSESSMENT — ENCOUNTER SYMPTOMS
WEAKNESS: 0
ABDOMINAL PAIN: 0
PALPITATIONS: 0
SHORTNESS OF BREATH: 0
DIARRHEA: 0
HEADACHES: 0
FREQUENCY: 0
DYSURIA: 0
MYALGIAS: 0
NERVOUS/ANXIOUS: 0
COUGH: 0
CONSTIPATION: 0
CHILLS: 0
SORE THROAT: 0
EYE PAIN: 0
ARTHRALGIAS: 0
HEARTBURN: 0
FEVER: 0
PARESTHESIAS: 0
DIZZINESS: 0
HEMATOCHEZIA: 0
HEMATURIA: 0
NAUSEA: 0
JOINT SWELLING: 0

## 2024-01-12 ENCOUNTER — OFFICE VISIT (OUTPATIENT)
Dept: FAMILY MEDICINE | Facility: CLINIC | Age: 39
End: 2024-01-12
Payer: COMMERCIAL

## 2024-01-12 VITALS
WEIGHT: 251 LBS | HEIGHT: 67 IN | RESPIRATION RATE: 16 BRPM | TEMPERATURE: 98.4 F | DIASTOLIC BLOOD PRESSURE: 88 MMHG | OXYGEN SATURATION: 98 % | SYSTOLIC BLOOD PRESSURE: 130 MMHG | HEART RATE: 119 BPM | BODY MASS INDEX: 39.39 KG/M2

## 2024-01-12 DIAGNOSIS — E78.5 HYPERLIPIDEMIA LDL GOAL <100: ICD-10-CM

## 2024-01-12 DIAGNOSIS — Z00.00 ROUTINE GENERAL MEDICAL EXAMINATION AT A HEALTH CARE FACILITY: Primary | ICD-10-CM

## 2024-01-12 DIAGNOSIS — E66.01 MORBID OBESITY (H): ICD-10-CM

## 2024-01-12 DIAGNOSIS — Z82.49 FAMILY HISTORY OF ISCHEMIC HEART DISEASE: ICD-10-CM

## 2024-01-12 DIAGNOSIS — L40.9 PSORIASIS: ICD-10-CM

## 2024-01-12 DIAGNOSIS — F90.9 ATTENTION DEFICIT HYPERACTIVITY DISORDER (ADHD), UNSPECIFIED ADHD TYPE: ICD-10-CM

## 2024-01-12 DIAGNOSIS — Z13.1 SCREENING FOR DIABETES MELLITUS: ICD-10-CM

## 2024-01-12 DIAGNOSIS — F32.1 CURRENT MODERATE EPISODE OF MAJOR DEPRESSIVE DISORDER WITHOUT PRIOR EPISODE (H): ICD-10-CM

## 2024-01-12 PROCEDURE — 99396 PREV VISIT EST AGE 40-64: CPT | Performed by: FAMILY MEDICINE

## 2024-01-12 RX ORDER — CALCIPOTRIENE 50 UG/G
OINTMENT TOPICAL
Qty: 120 G | Refills: 1 | Status: SHIPPED | OUTPATIENT
Start: 2024-01-12

## 2024-01-12 RX ORDER — DULOXETIN HYDROCHLORIDE 60 MG/1
60 CAPSULE, DELAYED RELEASE ORAL DAILY
COMMUNITY
Start: 2023-12-05

## 2024-01-12 RX ORDER — LISDEXAMFETAMINE DIMESYLATE 60 MG/1
CAPSULE ORAL
COMMUNITY
Start: 2023-09-28 | End: 2024-05-17

## 2024-01-12 ASSESSMENT — ANXIETY QUESTIONNAIRES
7. FEELING AFRAID AS IF SOMETHING AWFUL MIGHT HAPPEN: NOT AT ALL
5. BEING SO RESTLESS THAT IT IS HARD TO SIT STILL: NOT AT ALL
4. TROUBLE RELAXING: NEARLY EVERY DAY
GAD7 TOTAL SCORE: 3
1. FEELING NERVOUS, ANXIOUS, OR ON EDGE: NOT AT ALL
3. WORRYING TOO MUCH ABOUT DIFFERENT THINGS: NOT AT ALL
GAD7 TOTAL SCORE: 3
7. FEELING AFRAID AS IF SOMETHING AWFUL MIGHT HAPPEN: NOT AT ALL
8. IF YOU CHECKED OFF ANY PROBLEMS, HOW DIFFICULT HAVE THESE MADE IT FOR YOU TO DO YOUR WORK, TAKE CARE OF THINGS AT HOME, OR GET ALONG WITH OTHER PEOPLE?: NOT DIFFICULT AT ALL
6. BECOMING EASILY ANNOYED OR IRRITABLE: NOT AT ALL
2. NOT BEING ABLE TO STOP OR CONTROL WORRYING: NOT AT ALL
GAD7 TOTAL SCORE: 3
IF YOU CHECKED OFF ANY PROBLEMS ON THIS QUESTIONNAIRE, HOW DIFFICULT HAVE THESE PROBLEMS MADE IT FOR YOU TO DO YOUR WORK, TAKE CARE OF THINGS AT HOME, OR GET ALONG WITH OTHER PEOPLE: NOT DIFFICULT AT ALL

## 2024-01-12 ASSESSMENT — PATIENT HEALTH QUESTIONNAIRE - PHQ9
10. IF YOU CHECKED OFF ANY PROBLEMS, HOW DIFFICULT HAVE THESE PROBLEMS MADE IT FOR YOU TO DO YOUR WORK, TAKE CARE OF THINGS AT HOME, OR GET ALONG WITH OTHER PEOPLE: NOT DIFFICULT AT ALL
SUM OF ALL RESPONSES TO PHQ QUESTIONS 1-9: 4
SUM OF ALL RESPONSES TO PHQ QUESTIONS 1-9: 4

## 2024-01-12 ASSESSMENT — ENCOUNTER SYMPTOMS
ABDOMINAL PAIN: 0
DYSURIA: 0
WEAKNESS: 0
HEMATOCHEZIA: 0
SORE THROAT: 0
HEADACHES: 0
COUGH: 0
JOINT SWELLING: 0
HEMATURIA: 0
FEVER: 0
SHORTNESS OF BREATH: 0
FREQUENCY: 0
EYE PAIN: 0
PALPITATIONS: 0
ARTHRALGIAS: 0
DIZZINESS: 0
PARESTHESIAS: 0
CONSTIPATION: 0
HEARTBURN: 0
DIARRHEA: 0
CHILLS: 0
NERVOUS/ANXIOUS: 0
NAUSEA: 0
MYALGIAS: 0

## 2024-01-12 NOTE — PROGRESS NOTES
SUBJECTIVE:   Luis Felipe is a 38 year old, presenting for the following:  Physical        1/12/2024     1:12 PM   Additional Questions   Roomed by Joleen LERNER CMA       Healthy Habits:     Getting at least 3 servings of Calcium per day:  Yes    Bi-annual eye exam:  NO    Dental care twice a year:  Yes    Sleep apnea or symptoms of sleep apnea:  None    Diet:  Regular (no restrictions) and Low fat/cholesterol    Frequency of exercise:  None    Taking medications regularly:  Yes    Medication side effects:  Other    Additional concerns today:  Yes      Today's PHQ-9 Score:       1/12/2024     1:09 PM   PHQ-9 SCORE   PHQ-9 Total Score MyChart 4 (Minimal depression)   PHQ-9 Total Score 4       Depression Followup-followed by psychiatrist - Cristobal Grier NP - at [facility]   How are you doing with your depression since your last visit? Improved   Are you having other symptoms that might be associated with depression? Yes:  lack of ability to enjoy things   Have you had a significant life event?  No   Are you feeling anxious or having panic attacks?   No  Do you have any concerns with your use of alcohol or other drugs? No    Social History     Tobacco Use    Smoking status: Never    Smokeless tobacco: Never   Vaping Use    Vaping Use: Never used   Substance Use Topics    Alcohol use: Yes     Comment: once or twice a month - have a drink when out to dinner with    Drug use: Never         12/11/2022     3:20 PM 7/19/2023     8:53 AM 1/12/2024     1:09 PM   PHQ   PHQ-9 Total Score 12 9 4   Q9: Thoughts of better off dead/self-harm past 2 weeks Not at all Not at all Not at all         1/12/2024     1:10 PM   OSVALDO-7 SCORE   Total Score 3 (minimal anxiety)   Total Score 3         1/12/2024     1:09 PM   Last PHQ-9   1.  Little interest or pleasure in doing things 1   2.  Feeling down, depressed, or hopeless 1   3.  Trouble falling or staying asleep, or sleeping too much 1   4.  Feeling tired or having  "little energy 0   5.  Poor appetite or overeating 0   6.  Feeling bad about yourself 0   7.  Trouble concentrating 1   8.  Moving slowly or restless 0   Q9: Thoughts of better off dead/self-harm past 2 weeks 0   PHQ-9 Total Score 4         1/12/2024     1:10 PM   OSVALDO-7    1. Feeling nervous, anxious, or on edge 0   2. Not being able to stop or control worrying 0   3. Worrying too much about different things 0   4. Trouble relaxing 3   5. Being so restless that it is hard to sit still 0   6. Becoming easily annoyed or irritable 0   7. Feeling afraid, as if something awful might happen 0   OSVALDO-7 Total Score 3   If you checked any problems, how difficult have they made it for you to do your work, take care of things at home, or get along with other people? Not difficult at all       Social History     Tobacco Use    Smoking status: Never    Smokeless tobacco: Never   Substance Use Topics    Alcohol use: Yes     Comment: once or twice a month - have a drink when out to dinner with             1/8/2024    11:50 AM   Alcohol Use   Prescreen: >3 drinks/day or >7 drinks/week? No       Last PSA: No results found for: \"PSA\"    Reviewed orders with patient. Reviewed health maintenance and updated orders accordingly - Yes  Labs reviewed in EPIC    Reviewed and updated as needed this visit by clinical staff    Allergies               Reviewed and updated as needed this visit by Provider                   Review of Systems   Constitutional:  Negative for chills and fever.   HENT:  Negative for congestion, ear pain, hearing loss and sore throat.    Eyes:  Negative for pain and visual disturbance.   Respiratory:  Negative for cough and shortness of breath.    Cardiovascular:  Negative for chest pain, palpitations and peripheral edema.   Gastrointestinal:  Negative for abdominal pain, constipation, diarrhea, heartburn, hematochezia and nausea.   Genitourinary:  Negative for dysuria, frequency, genital sores, hematuria, impotence, " "penile discharge and urgency.   Musculoskeletal:  Negative for arthralgias, joint swelling and myalgias.   Skin:  Negative for rash.   Neurological:  Negative for dizziness, weakness, headaches and paresthesias.   Psychiatric/Behavioral:  Negative for mood changes. The patient is not nervous/anxious.          OBJECTIVE:   /88   Pulse 119   Temp 98.4  F (36.9  C) (Tympanic)   Resp 16   Ht 1.702 m (5' 7\")   Wt 113.9 kg (251 lb)   SpO2 98%   BMI 39.31 kg/m      Physical Exam  GENERAL: healthy, alert and no distress  EYES: Eyes grossly normal to inspection  HENT: ear canals and TM's normal, nose and mouth without ulcers or lesions  NECK: no adenopathy, no asymmetry, masses, or scars and thyroid normal to palpation  RESP: lungs clear to auscultation - no rales, rhonchi or wheezes  CV: regular rates and rhythm, normal S1 S2, no S3 or S4, and no murmur, click or rub  MS: no gross musculoskeletal defects noted, no edema  SKIN: no suspicious lesions or rashes; erythematous scaly patches on right elbow, legs  PSYCH: mentation appears normal, affect normal/bright        ASSESSMENT/PLAN:   1. Routine general medical examination at a health care facility  Health maintenance reviewed and updated.       2. Hyperlipidemia LDL goal <100  - CT Coronary Calcium Scan; Future  - Lipid panel reflex to direct LDL Fasting; Future    3. Family history of ischemic heart disease  - CT Coronary Calcium Scan; Future    4. Psoriasis  Continue topical clobetasol. Add Dovonex ointment. If not improving, follow up with dermatology  - calcipotriene (DOVONOX) 0.005 % external ointment; Apply a thin film of ointment to the affected skin 1-2x daily.  Dispense: 120 g; Refill: 1  - Adult Dermatology  Referral; Future    5. Current moderate episode of major depressive disorder without prior episode (H)  Stable, following with psychiatry.    6. Morbid obesity (H)  Emphasized importance of balanced diet and regular exercise.    7. " "Attention deficit hyperactivity disorder (ADHD), unspecified ADHD type  Following with psychiatry.    8. Screening for diabetes mellitus  - Comprehensive metabolic panel (BMP + Alb, Alk Phos, ALT, AST, Total. Bili, TP); Future      Patient has been advised of split billing requirements and indicates understanding: Yes      COUNSELING:   Reviewed preventive health counseling, as reflected in patient instructions      BMI:   Estimated body mass index is 39.31 kg/m  as calculated from the following:    Height as of this encounter: 1.702 m (5' 7\").    Weight as of this encounter: 113.9 kg (251 lb).   Weight management plan: Discussed healthy diet and exercise guidelines      He reports that he has never smoked. He has never used smokeless tobacco.            Kel Foster, [facility] [place]                                                  "

## 2024-01-12 NOTE — PATIENT INSTRUCTIONS
Preventive Health Recommendations  Male Ages 40 to 49    Yearly exam:             See your health care provider every year in order to  o   Review health changes.   o   Discuss preventive care.    o   Review your medicines if your doctor has prescribed any.  You should be tested each year for STDs (sexually transmitted diseases) if you re at risk.   Have a cholesterol test every 5 years.   Have a colonoscopy (test for colon cancer) beginning at age 45.  Ask your provider about other options like a yearly FIT test or Cologuard test every 3 years (stool tests)   After age 45, have a diabetes test (fasting glucose). If you are at risk for diabetes, you should have this test every 3 years.    Talk with your health care provider about whether or not a prostate cancer screening test (PSA) is right for you.    Shots: Get a flu shot each year. Get a tetanus shot every 10 years.     Nutrition:  Eat at least 5 servings of fruits and vegetables daily.   Eat whole-grain bread, whole-wheat pasta and brown rice instead of white grains and rice.   Get adequate Calcium and Vitamin D.     Lifestyle  Exercise for at least 150 minutes a week (30 minutes a day, 5 days a week). This will help you control your weight and prevent disease.   Limit alcohol to one drink per day.   No smoking.   Wear sunscreen to prevent skin cancer.   See your dentist every six months for an exam and cleaning.      Preventive Health Recommendations  Male Ages 40 to 49    Yearly exam:             See your health care provider every year in order to  o   Review health changes.   o   Discuss preventive care.    o   Review your medicines if your doctor has prescribed any.  You should be tested each year for STDs (sexually transmitted diseases) if you re at risk.   Have a cholesterol test every 5 years.   Have a colonoscopy (test for colon cancer) beginning at age 45.  Ask your provider about other options like a yearly FIT test or Cologuard test every 3 years  (stool tests)   After age 45, have a diabetes test (fasting glucose). If you are at risk for diabetes, you should have this test every 3 years.    Talk with your health care provider about whether or not a prostate cancer screening test (PSA) is right for you.    Shots: Get a flu shot each year. Get a tetanus shot every 10 years.     Nutrition:  Eat at least 5 servings of fruits and vegetables daily.   Eat whole-grain bread, whole-wheat pasta and brown rice instead of white grains and rice.   Get adequate Calcium and Vitamin D.     Lifestyle  Exercise for at least 150 minutes a week (30 minutes a day, 5 days a week). This will help you control your weight and prevent disease.   Limit alcohol to one drink per day.   No smoking.   Wear sunscreen to prevent skin cancer.   See your dentist every six months for an exam and cleaning.

## 2024-01-23 ENCOUNTER — OFFICE VISIT (OUTPATIENT)
Dept: FAMILY MEDICINE | Facility: CLINIC | Age: 39
End: 2024-01-23
Payer: COMMERCIAL

## 2024-01-23 DIAGNOSIS — L40.9 PSORIASIS: ICD-10-CM

## 2024-01-23 PROCEDURE — 99204 OFFICE O/P NEW MOD 45 MIN: CPT | Performed by: PHYSICIAN ASSISTANT

## 2024-01-23 RX ORDER — HALOBETASOL PROPIONATE 0.05 %
OINTMENT (GRAM) TOPICAL 2 TIMES DAILY
Qty: 100 G | Refills: 3 | Status: SHIPPED | OUTPATIENT
Start: 2024-01-23

## 2024-01-23 ASSESSMENT — PAIN SCALES - GENERAL: PAINLEVEL: NO PAIN (0)

## 2024-01-23 NOTE — PROGRESS NOTES
[facility] Dermatology Note  Encounter Date: Jan 23, 2024  Office Visit     Dermatology Problem List:  1. Chronic plaque psoriasis  Current tx: calcipotriene 0.005%, halobetasol 0.05%, nbUVB  Previous tx: clobetasol 0.05%    ____________________________________________    Assessment & Plan:    # Plaque Psoriasis, without evidence of psoriatic arthritis or nail involvement.   Start nbUVB treatments with oil, 3 times weekly. Skin type 1. Increase by 20%. Start at 100 mj. Consent signed. Discussed risk and benefits including burn, pain, scar, skin discoloration, itching, eye damage, worsening of skin condition, skin aging and skin cancers. Multiple treatments may be required.   - Stop clobetasol 0.05% external ointment.  - Start halobetasol (ULTRAVATE) 0.05 % ointment, apply topically 2 times daily to rashes x 2 weeks then weekends, then bid on weekends and repeat as needed.   - Continue calcipotriene 0.005% external ointment bid.     Procedures Performed:   None    Follow-up: 3 month(s) in-person, or earlier for new or changing lesions    Staff and Scribe:     Scribe Disclosure:   IGUZMAN, am serving as a scribe; to document services personally performed by Deanne Painter PA-C-based on data collection and the provider's statements to me.      Provider Disclosure:   The documentation recorded by the scribe accurately reflects the services I personally performed and the decisions made by me.    All risks, benefits and alternatives were discussed with patient.  Patient is in agreement and understands the assessment and plan.  All questions were answered.  Sun Screen Education was given.   Return to Clinic in 3 months or sooner as needed.   Deanne Painter PA-C   [facility]    ____________________________________________    CC: Psoriasis (Here to follow up on psoriasis flare up, sx worse on legs and back)    HPI:  Mr. Juan Burleson is a(n) 38 year  old male who presents today as a new patient for psoriasis. He was first diagnosed with psoriasis at least 10 years ago. He has been able to manage it with clobetasol for years by doing 2 weeks on 2 weeks off cycles. He has also been using calcipotriene 0.005% external ointment for about one week.  However, recently, it has become more difficult to manage. He keeps getting new spots, and he states it is most active on areas that experience pressure, such as, his feet, legs, waist band. He denies issues with joints and nails. He mentions it got better when he used the phototherapy in the past.    He mentions his brother and other extended family members have psoriasis and rosacea.   Patient is otherwise feeling well, without additional skin concerns.    Labs Reviewed:  N/A    Physical Exam:  Vitals: There were no vitals taken for this visit.  SKIN: Focused examination of forearms was performed. Patient deferred full skin check  - Well-defined pink scaly plaques on the upper arms, elbows, and forearms  - Patient has photos of psoriatic plaques on buttock, abdomen, and scattered throughout lower extremities.  - No other lesions of concern on areas examined.     Medications:  Current Outpatient Medications   Medication    buPROPion (WELLBUTRIN XL) 300 MG 24 hr tablet    calcipotriene (DOVONOX) 0.005 % external ointment    clobetasol (TEMOVATE) 0.05 % external ointment    cloNIDine (CATAPRES) 0.1 MG tablet    DULoxetine (CYMBALTA) 60 MG capsule    lisdexamfetamine (VYVANSE) 60 MG capsule    Multiple Vitamins-Minerals (MULTIVITAMIN ADULTS PO)     No current facility-administered medications for this visit.      Past Medical History:   Patient Active Problem List   Diagnosis    Lumbar radiculopathy - left lateral foot numbness    Major depression    Myopia    Psoriasis    Morbid obesity (H)    Hyperlipidemia LDL goal <100    ADHD (attention deficit hyperactivity disorder)    Current moderate episode of major depressive  disorder without prior episode (H)     Past Medical History:   Diagnosis Date    Depressive disorder     started when I was a teen        CC Kel Foster, DO  [address] [place] [address] on close of this encounter.

## 2024-01-23 NOTE — PATIENT INSTRUCTIONS
Patient Education       Proper skin care from [facility]:    -Eliminate harsh soaps as they strip the natural oils from the skin, often resulting in dry itchy skin ( i.e. Dial, Zest, [place] Spring)  -Use mild soaps such as Cetaphil or Dove Sensitive Skin in the shower. You do not need to use soap on arms, legs, and trunk every time you shower unless visibly soiled.   -Avoid hot or cold showers.  -After showering, lightly dry off and apply moisturizing within 2-3 minutes. This will help trap moisture in the skin.   -Aggressive use of a moisturizer at least 1-2 times a day to the entire body (including -Vanicream, Cetaphil, Aquaphor or Cerave) and moisturize hands after every washing.  -We recommend using moisturizers that come in a tub that needs to be scooped out, not a pump. This has more of an oil base. It will hold moisture in your skin much better than a water base moisturizer. The above recommended are non-pore clogging.      Wear a sunscreen with at least SPF 30 on your face, ears, neck and V of the chest daily. Wear sunscreen on other areas of the body if those areas are exposed to the sun throughout the day. Sunscreens can contain physical and/or chemical blockers. Physical blockers are less likely to clog pores, these include zinc oxide and titanium dioxide. Reapply every two hour and after swimming.     Sunscreen examples: https://www.ewg.org/sunscreen/    UV radiation  UVA radiation remains constant throughout the day and throughout the year. It is a longer wavelength than UVB and therefore penetrates deeper into the skin leading to immediate and delayed tanning, photoaging, and skin cancer. 70-80% of UVA and UVB radiation occurs between the hours of 10am-2pm.  UVB radiation  UVB radiation causes the most harmful effects and is more significant during the summer months. However, snow and ice can reflect UVB radiation leading to skin damage during the winter months as well. UVB radiation is  responsible for tanning, burning, inflammation, delayed erythema (pinkness), pigmentation (brown spots), and skin cancer.     I recommend self monthly full body exams and yearly full body exams with a dermatology provider. If you develop a new or changing lesion please follow up for examination. Most skin cancers are pink and scaly or pink and pearly. However, we do see blue/brown/black skin cancers.  Consider the ABCDEs of melanoma when giving yourself your monthly full body exam ( don't forget the groin, buttocks, feet, toes, etc). A-asymmetry, B-borders, C-color, D-diameter, E-elevation or evolving. If you see any of these changes please follow up in clinic. If you cannot see your back I recommend purchasing a hand held mirror to use with a larger wall mirror.       Checking for Skin Cancer  You can find cancer early by checking your skin each month. There are 3 kinds of skin cancer. They are melanoma, basal cell carcinoma, and squamous cell carcinoma. Doing monthly skin checks is the best way to find new marks or skin changes. Follow the instructions below for checking your skin.   The ABCDEs of checking moles for melanoma   Check your moles or growths for signs of melanoma using ABCDE:   Asymmetry: the sides of the mole or growth don t match  Border: the edges are ragged, notched, or blurred  Color: the color within the mole or growth varies  Diameter: the mole or growth is larger than 6 mm (size of a pencil eraser)  Evolving: the size, shape, or color of the mole or growth is changing (evolving is not shown in the images below)    Checking for other types of skin cancer  Basal cell carcinoma or squamous cell carcinoma have symptoms such as:     A spot or mole that looks different from all other marks on your skin  Changes in how an area feels, such as itching, tenderness, or pain  Changes in the skin's surface, such as oozing, bleeding, or scaliness  A sore that does not heal  New swelling or redness beyond  the border of a mole    Who s at risk?  Anyone can get skin cancer. But you are at greater risk if you have:   Fair skin, light-colored hair, or light-colored eyes  Many moles or abnormal moles on your skin  A history of sunburns from sunlight or tanning beds  A family history of skin cancer  A history of exposure to radiation or chemicals  A weakened immune system  If you have had skin cancer in the past, you are at risk for recurring skin cancer.   How to check your skin  Do your monthly skin checkups in front of a full-length mirror. Check all parts of your body, including your:   Head (ears, face, neck, and scalp)  Torso (front, back, and sides)  Arms (tops, undersides, upper, and lower armpits)  Hands (palms, backs, and fingers, including under the nails)  Buttocks and genitals  Legs (front, back, and sides)  Feet (tops, soles, toes, including under the nails, and between toes)  If you have a lot of moles, take digital photos of them each month. Make sure to take photos both up close and from a distance. These can help you see if any moles change over time.   Most skin changes are not cancer. But if you see any changes in your skin, call your doctor right away. Only he or she can diagnose a problem. If you have skin cancer, seeing your doctor can be the first step toward getting the treatment that could save your life.   [employer] last reviewed this educational content on 4/1/2019 2000-2020 [employer]. [address] [place] [address], [place] [address]. All rights reserved. This information is not intended as a substitute for professional medical care. Always follow your healthcare professional's instructions.       When should I call my doctor?  If you are worsening or not improving, please, contact us or seek urgent care as noted below.     Who should I call with questions (adults)?  [facility] (adult and pediatric): [phone]  [facility]  [place] (adult): [phone]  [facility] ([place], [place], [place] and [place]) [phone]  For urgent needs outside of business hours call the [facility] at [phone] and ask for the dermatology resident on call to be paged  If this is a medical emergency and you are unable to reach an ER, Call 911      If you need a prescription refill, please contact your pharmacy. Refills are approved or denied by our Physicians during normal business hours, Monday through Fridays  Per office policy, refills will not be granted if you have not been seen within the past year (or sooner depending on your child's condition)

## 2024-02-21 ENCOUNTER — OFFICE VISIT (OUTPATIENT)
Dept: FAMILY MEDICINE | Facility: CLINIC | Age: 39
End: 2024-02-21
Payer: COMMERCIAL

## 2024-02-21 DIAGNOSIS — L40.9 PSORIASIS: Primary | ICD-10-CM

## 2024-02-21 PROCEDURE — 96900 ACTINOTHERAPY UV LIGHT: CPT | Performed by: NURSE PRACTITIONER

## 2024-02-21 NOTE — LETTER
2/21/2024         RE: Juan Burleson  [address]  [place] [address]        Dear Colleague,    Thank you for referring your patient, Juan Burleson, to the [facility]. Please see a copy of my visit note below.    NB-UVB treatment for psoriasis(DX)  Areas being treated: entire body  Treatment # 1  Issues following previous treatment : initial  photoprotection on eyes, lips, nipples  74 mj  min 9 seconds today .   Mineral oil applied by patient (by patient, staff, or none)  Goal 2-3 times weekly for a total of 12 weeks or maintenance dosing for 12 weeks (if this  time line has passed we need documentation of maintenance dosing )  Last treatment:  No complications  MD visit scheduled Deanne Painter 5/14/24    MARISSA CRUM MA       Attestation signed by Stacey Welch APRN CNP at 2/21/2024  8:11 AM:  I agree with the information in this note.    SIMON Mathias CNP, CNP       Again, thank you for allowing me to participate in the care of your patient.        Sincerely,        SIMON Mathias CNP

## 2024-02-21 NOTE — PROGRESS NOTES
NB-UVB treatment for psoriasis(DX)  Areas being treated: entire body  Treatment # 1  Issues following previous treatment : initial  photoprotection on eyes, lips, nipples  74 mj  min 9 seconds today .   Mineral oil applied by patient (by patient, staff, or none)  Goal 2-3 times weekly for a total of 12 weeks or maintenance dosing for 12 weeks (if this  time line has passed we need documentation of maintenance dosing )  Last treatment:  No complications  MD visit scheduled Deanne Painter 5/14/24    MARISSA CRUM MA

## 2024-02-26 ENCOUNTER — OFFICE VISIT (OUTPATIENT)
Dept: FAMILY MEDICINE | Facility: CLINIC | Age: 39
End: 2024-02-26
Payer: COMMERCIAL

## 2024-02-26 DIAGNOSIS — L40.9 PSORIASIS: Primary | ICD-10-CM

## 2024-02-26 PROCEDURE — 96900 ACTINOTHERAPY UV LIGHT: CPT | Performed by: PHYSICIAN ASSISTANT

## 2024-02-26 NOTE — PROGRESS NOTES
NB-UVB treatment for psoriasis(DX)  Areas being treated: entire body  Treatment # 2  Issues following previous treatment : initial  photoprotection on eyes, lips, nipples  78 mj  min 10 seconds today .   Mineral oil applied by patient (by patient, staff, or none)  Goal 2-3 times weekly for a total of 12 weeks or maintenance dosing for 12 weeks (if this  time line has passed we need documentation of maintenance dosing )  Last treatment:  No complications  MD visit scheduled Deanne Painter 5/14/24     HILL PULLIAM PA-C, MPAS  [facility] [place]: Phone: [phone], Fax: [phone]  [facility]: Phone: [phone],  Fax: [phone]  [facility]: Phone: [phone], Fax: [phone]

## 2024-02-26 NOTE — LETTER
2/26/2024         RE: Juan Burleson  [address]  [place] [address]        Dear Colleague,    Thank you for referring your patient, Juan Burleson, to the [facility]. Please see a copy of my visit note below.    NB-UVB treatment for psoriasis(DX)  Areas being treated: entire body  Treatment # 2  Issues following previous treatment : initial  photoprotection on eyes, lips, nipples  78 mj  min 10 seconds today .   Mineral oil applied by patient (by patient, staff, or none)  Goal 2-3 times weekly for a total of 12 weeks or maintenance dosing for 12 weeks (if this  time line has passed we need documentation of maintenance dosing )  Last treatment:  No complications  MD visit scheduled Deanne Painter 5/14/24     HILL PULLIAM PA-C, [facility]  [facility]: Phone: [phone], Fax: [phone]  [facility]: Phone: [phone],  Fax: [phone]  [facility]: Phone: [phone], Fax: [phone]            Again, thank you for allowing me to participate in the care of your patient.        Sincerely,        Dorie Hanna PA-C

## 2024-02-28 ENCOUNTER — OFFICE VISIT (OUTPATIENT)
Dept: FAMILY MEDICINE | Facility: CLINIC | Age: 39
End: 2024-02-28
Payer: COMMERCIAL

## 2024-02-28 DIAGNOSIS — L40.9 PSORIASIS: Primary | ICD-10-CM

## 2024-02-28 PROCEDURE — 96900 ACTINOTHERAPY UV LIGHT: CPT | Performed by: NURSE PRACTITIONER

## 2024-02-28 NOTE — LETTER
2/28/2024         RE: Juan Burleson  [address]  [place] [address]        Dear Colleague,    Thank you for referring your patient, Juan Burleson, to the [facility]. Please see a copy of my visit note below.    NB-UVB treatment for psoriasis(DX)  Areas being treated: entire body  Treatment # 3  Issues following previous treatment : initial  photoprotection on eyes, lips, nipples  90 mj  min 11 seconds today .   Mineral oil applied by patient (by patient, staff, or none)  Goal 2-3 times weekly for a total of 12 weeks or maintenance dosing for 12 weeks (if this  time line has passed we need documentation of maintenance dosing )  Last treatment:  No complications  MD visit scheduled Deanne Painter 5/14/24     MARISSA CRUM MA    Attestation signed by Stacey Welch APRN CNP at 2/28/2024  3:48 PM:  I agree with the information in this note.    SIMON Mathias CNP, CNP       Again, thank you for allowing me to participate in the care of your patient.        Sincerely,        SIMON Mathias CNP

## 2024-02-28 NOTE — PROGRESS NOTES
NB-UVB treatment for psoriasis(DX)  Areas being treated: entire body  Treatment # 3  Issues following previous treatment : initial  photoprotection on eyes, lips, nipples  90 mj  min 11 seconds today .   Mineral oil applied by patient (by patient, staff, or none)  Goal 2-3 times weekly for a total of 12 weeks or maintenance dosing for 12 weeks (if this  time line has passed we need documentation of maintenance dosing )  Last treatment:  No complications  MD visit scheduled Deanne Painter 5/14/24     MARISSA CRUM MA

## 2024-03-04 ENCOUNTER — OFFICE VISIT (OUTPATIENT)
Dept: FAMILY MEDICINE | Facility: CLINIC | Age: 39
End: 2024-03-04
Payer: COMMERCIAL

## 2024-03-04 DIAGNOSIS — L40.9 PSORIASIS: Primary | ICD-10-CM

## 2024-03-04 PROCEDURE — 96900 ACTINOTHERAPY UV LIGHT: CPT | Performed by: NURSE PRACTITIONER

## 2024-03-04 NOTE — LETTER
3/4/2024         RE: Juan Burleson  [address]  [place] [address]        Dear Colleague,    Thank you for referring your patient, Juan Burleson, to the [facility]. Please see a copy of my visit note below.    NB-UVB treatment for psoriasis(DX)  Areas being treated: entire body  Treatment # 4  Issues following previous treatment : initial  photoprotection on eyes, lips, nipples  90 mj  min 11 seconds today .   Mineral oil applied by patient (by patient, staff, or none)  Goal 2-3 times weekly for a total of 12 weeks or maintenance dosing for 12 weeks (if this  time line has passed we need documentation of maintenance dosing )  Last treatment:  No complications  MD visit scheduled Deanne Painter 5/14/24    Attestation signed by Stacey Welch APRN CNP at 3/4/2024  8:11 AM:  I agree with the information in this note.    SIMON Mathias CNP, CNP       Again, thank you for allowing me to participate in the care of your patient.        Sincerely,        SIMON Mathias CNP

## 2024-03-04 NOTE — PATIENT INSTRUCTIONS
Patient Education       Proper skin care from [facility]:    -Eliminate harsh soaps as they strip the natural oils from the skin, often resulting in dry itchy skin ( i.e. Dial, Zest, Uzbek Spring)  -Use mild soaps such as Cetaphil or Dove Sensitive Skin in the shower. You do not need to use soap on arms, legs, and trunk every time you shower unless visibly soiled.   -Avoid hot or cold showers.  -After showering, lightly dry off and apply moisturizing within 2-3 minutes. This will help trap moisture in the skin.   -Aggressive use of a moisturizer at least 1-2 times a day to the entire body (including -Vanicream, Cetaphil, Aquaphor or Cerave) and moisturize hands after every washing.  -We recommend using moisturizers that come in a tub that needs to be scooped out, not a pump. This has more of an oil base. It will hold moisture in your skin much better than a water base moisturizer. The above recommended are non-pore clogging.      Wear a sunscreen with at least SPF 30 on your face, ears, neck and V of the chest daily. Wear sunscreen on other areas of the body if those areas are exposed to the sun throughout the day. Sunscreens can contain physical and/or chemical blockers. Physical blockers are less likely to clog pores, these include zinc oxide and titanium dioxide. Reapply every two hour and after swimming.     Sunscreen examples: https://www.ewg.org/sunscreen/    UV radiation  UVA radiation remains constant throughout the day and throughout the year. It is a longer wavelength than UVB and therefore penetrates deeper into the skin leading to immediate and delayed tanning, photoaging, and skin cancer. 70-80% of UVA and UVB radiation occurs between the hours of 10am-2pm.  UVB radiation  UVB radiation causes the most harmful effects and is more significant during the summer months. However, snow and ice can reflect UVB radiation leading to skin damage during the winter months as well. UVB radiation is  responsible for tanning, burning, inflammation, delayed erythema (pinkness), pigmentation (brown spots), and skin cancer.     I recommend self monthly full body exams and yearly full body exams with a dermatology provider. If you develop a new or changing lesion please follow up for examination. Most skin cancers are pink and scaly or pink and pearly. However, we do see blue/brown/black skin cancers.  Consider the ABCDEs of melanoma when giving yourself your monthly full body exam ( don't forget the groin, buttocks, feet, toes, etc). A-asymmetry, B-borders, C-color, D-diameter, E-elevation or evolving. If you see any of these changes please follow up in clinic. If you cannot see your back I recommend purchasing a hand held mirror to use with a larger wall mirror.       Checking for Skin Cancer  You can find cancer early by checking your skin each month. There are 3 kinds of skin cancer. They are melanoma, basal cell carcinoma, and squamous cell carcinoma. Doing monthly skin checks is the best way to find new marks or skin changes. Follow the instructions below for checking your skin.   The ABCDEs of checking moles for melanoma   Check your moles or growths for signs of melanoma using ABCDE:   Asymmetry: the sides of the mole or growth don t match  Border: the edges are ragged, notched, or blurred  Color: the color within the mole or growth varies  Diameter: the mole or growth is larger than 6 mm (size of a pencil eraser)  Evolving: the size, shape, or color of the mole or growth is changing (evolving is not shown in the images below)    Checking for other types of skin cancer  Basal cell carcinoma or squamous cell carcinoma have symptoms such as:     A spot or mole that looks different from all other marks on your skin  Changes in how an area feels, such as itching, tenderness, or pain  Changes in the skin's surface, such as oozing, bleeding, or scaliness  A sore that does not heal  New swelling or redness beyond  the border of a mole    Who s at risk?  Anyone can get skin cancer. But you are at greater risk if you have:   Fair skin, light-colored hair, or light-colored eyes  Many moles or abnormal moles on your skin  A history of sunburns from sunlight or tanning beds  A family history of skin cancer  A history of exposure to radiation or chemicals  A weakened immune system  If you have had skin cancer in the past, you are at risk for recurring skin cancer.   How to check your skin  Do your monthly skin checkups in front of a full-length mirror. Check all parts of your body, including your:   Head (ears, face, neck, and scalp)  Torso (front, back, and sides)  Arms (tops, undersides, upper, and lower armpits)  Hands (palms, backs, and fingers, including under the nails)  Buttocks and genitals  Legs (front, back, and sides)  Feet (tops, soles, toes, including under the nails, and between toes)  If you have a lot of moles, take digital photos of them each month. Make sure to take photos both up close and from a distance. These can help you see if any moles change over time.   Most skin changes are not cancer. But if you see any changes in your skin, call your doctor right away. Only he or she can diagnose a problem. If you have skin cancer, seeing your doctor can be the first step toward getting the treatment that could save your life.   [employer] last reviewed this educational content on 4/1/2019 2000-2020 [employer]. [address] [place] [address], [place] [address]. All rights reserved. This information is not intended as a substitute for professional medical care. Always follow your healthcare professional's instructions.       When should I call my doctor?  If you are worsening or not improving, please, contact us or seek urgent care as noted below.     Who should I call with questions (adults)?  [facility] (adult and pediatric): [phone]  [facility]  Portal (adult): [phone]  [facility] ([place], [place], [place] and [place]) [phone]  For urgent needs outside of business hours call the [facility] at [phone] and ask for the dermatology resident on call to be paged  If this is a medical emergency and you are unable to reach an ER, Call 911      If you need a prescription refill, please contact your pharmacy. Refills are approved or denied by our Physicians during normal business hours, Monday through Fridays  Per office policy, refills will not be granted if you have not been seen within the past year (or sooner depending on your child's condition)

## 2024-03-06 ENCOUNTER — OFFICE VISIT (OUTPATIENT)
Dept: FAMILY MEDICINE | Facility: CLINIC | Age: 39
End: 2024-03-06
Payer: COMMERCIAL

## 2024-03-06 DIAGNOSIS — L40.9 PSORIASIS: Primary | ICD-10-CM

## 2024-03-06 PROCEDURE — 96900 ACTINOTHERAPY UV LIGHT: CPT | Performed by: NURSE PRACTITIONER

## 2024-03-06 NOTE — LETTER
3/6/2024         RE: Juan Burleson  [address]  [place] [address]        Dear Colleague,    Thank you for referring your patient, Juan Burleson, to the [facility]. Please see a copy of my visit note below.    NB-UVB treatment for psoriasis(DX)  Areas being treated: entire body  Treatment # 5  Skin Type 1  Issues following previous treatment : initial  photoprotection on eyes, lips, nipples  105mj  min 12 seconds today .   Mineral oil applied by patient (by patient, staff, or none)  Goal 2-3 times weekly for a total of 12 weeks or maintenance dosing for 12 weeks (if this  time line has passed we need documentation of maintenance dosing )  Last treatment:  No complications  MD visit scheduled Deanne Painter 5/14/24    MARISSA CRUM MA    Attestation signed by Stacey Welch APRN CNP at 3/6/2024  7:41 AM:  I agree with the information in this note.    SIMON Mathias CNP, CNP       Again, thank you for allowing me to participate in the care of your patient.        Sincerely,        SIMON Mathias CNP

## 2024-03-06 NOTE — PROGRESS NOTES
NB-UVB treatment for psoriasis(DX)  Areas being treated: entire body  Treatment # 5  Skin Type 1  Issues following previous treatment : initial  photoprotection on eyes, lips, nipples  105mj  min 12 seconds today .   Mineral oil applied by patient (by patient, staff, or none)  Goal 2-3 times weekly for a total of 12 weeks or maintenance dosing for 12 weeks (if this  time line has passed we need documentation of maintenance dosing )  Last treatment:  No complications  MD visit scheduled Deanne Painter 5/14/24    MARISSA CRUM MA

## 2024-03-11 ENCOUNTER — OFFICE VISIT (OUTPATIENT)
Dept: FAMILY MEDICINE | Facility: CLINIC | Age: 39
End: 2024-03-11
Payer: COMMERCIAL

## 2024-03-11 DIAGNOSIS — L40.9 PSORIASIS: Primary | ICD-10-CM

## 2024-03-11 PROCEDURE — 96900 ACTINOTHERAPY UV LIGHT: CPT | Performed by: PHYSICIAN ASSISTANT

## 2024-03-11 NOTE — PROGRESS NOTES
NB-UVB treatment for psoriasis(DX)  Areas being treated: entire body  Treatment # 6  Skin Type 1  Issues following previous treatment : initial  photoprotection on eyes, lips, nipples  124 mj  min 15 seconds today .   Mineral oil applied by patient (by patient, staff, or none)  Goal 2-3 times weekly for a total of 12 weeks or maintenance dosing for 12 weeks (if this  time line has passed we need documentation of maintenance dosing )  Last treatment:  No complications  MD visit scheduled Deanne Painter 5/14/24    Bertha LUX RN  [facility]atology [place]  [phone]    Dorie Hanna PA-C, MPAS  [facility] [place]: Phone: [phone], Fax: [phone]  [facility]: Phone: [phone],  Fax: [phone]  [facility]: Phone: [phone], Fax: [phone]

## 2024-03-11 NOTE — LETTER
3/11/2024         RE: Juan Burleson  [address]  [place] [address]        Dear Colleague,    Thank you for referring your patient, Juan Burleson, to the [facility] [place]. Please see a copy of my visit note below.    NB-UVB treatment for psoriasis(DX)  Areas being treated: entire body  Treatment #   Skin Type 1  Issues following previous treatment : initial  photoprotection on eyes, lips, nipples  124 mj  min 15 seconds today .   Mineral oil applied by patient (by patient, staff, or none)  Goal 2-3 times weekly for a total of 12 weeks or maintenance dosing for 12 weeks (if this  time line has passed we need documentation of maintenance dosing )  Last treatment:  No complications  MD visit scheduled Deanne Painter 5/14/24    Bertha LUX RN  [facility] [place]  [phone]    Dorie Hanna PA-C, [facility]  [facility] [place]: Phone: [phone], Fax: [phone]  [facility]: Phone: [phone],  Fax: [phone]  [facility]: Phone: [phone], Fax: [phone]              Again, thank you for allowing me to participate in the care of your patient.        Sincerely,        Dorie Hanna PA-C

## 2024-03-13 ENCOUNTER — OFFICE VISIT (OUTPATIENT)
Dept: FAMILY MEDICINE | Facility: CLINIC | Age: 39
End: 2024-03-13
Payer: COMMERCIAL

## 2024-03-13 DIAGNOSIS — L40.9 PSORIASIS: Primary | ICD-10-CM

## 2024-03-13 PROCEDURE — 96900 ACTINOTHERAPY UV LIGHT: CPT | Performed by: NURSE PRACTITIONER

## 2024-03-13 NOTE — PROGRESS NOTES
NB-UVB treatment for psoriasis(DX)  Areas being treated: entire body  Treatment # 7  Skin Type 1  Issues following previous treatment : initial  photoprotection on eyes, lips, nipples  139 mj  min 16 seconds today .   Mineral oil applied by patient (by patient, staff, or none)  Goal 2-3 times weekly for a total of 12 weeks or maintenance dosing for 12 weeks (if this  time line has passed we need documentation of maintenance dosing )  Last treatment:  No complications  MD visit scheduled Deanne Painter 5/14/24     Bertha LUX RN  [facility] Dermatology [place]  [phone]

## 2024-03-13 NOTE — LETTER
3/13/2024         RE: Juan Burleson  [address]  [place] [address]        Dear Colleague,    Thank you for referring your patient, Juan Burleson, to the [facility]. Please see a copy of my visit note below.    NB-UVB treatment for psoriasis(DX)  Areas being treated: entire body  Treatment # 7  Skin Type 1  Issues following previous treatment : initial  photoprotection on eyes, lips, nipples  139 mj  min 16 seconds today .   Mineral oil applied by patient (by patient, staff, or none)  Goal 2-3 times weekly for a total of 12 weeks or maintenance dosing for 12 weeks (if this  time line has passed we need documentation of maintenance dosing )  Last treatment:  No complications  MD visit scheduled Deanne Painter 5/14/24     Bertha LUX RN  [facility] Dermatology [address] [address]  [phone]    Attestation signed by Stacey Welch APRN CNP at 3/13/2024  8:06 AM:  I agree with the information in this note.    SIMON Mathias CNP, CNP       Again, thank you for allowing me to participate in the care of your patient.        Sincerely,        SIMON Mathias CNP

## 2024-03-25 ENCOUNTER — OFFICE VISIT (OUTPATIENT)
Dept: FAMILY MEDICINE | Facility: CLINIC | Age: 39
End: 2024-03-25
Payer: COMMERCIAL

## 2024-03-25 DIAGNOSIS — L40.9 PSORIASIS: Primary | ICD-10-CM

## 2024-03-25 PROCEDURE — 96900 ACTINOTHERAPY UV LIGHT: CPT | Performed by: NURSE PRACTITIONER

## 2024-03-25 NOTE — LETTER
3/25/2024         RE: Juan Burleson  [address]  [place] [address]        Dear Colleague,    Thank you for referring your patient, Juan Burleson, to the [facility]. Please see a copy of my visit note below.    NB-UVB treatment for psoriasis(DX)  Areas being treated: entire body  Treatment # 8  Skin Type 1  Issues following previous treatment : initial  photoprotection on eyes, lips, nipples  Due to not being here for 10 days decreased by 25%  108 mj  min 13 seconds today .   Mineral oil applied by patient (by patient, staff, or none)  Goal 2-3 times weekly for a total of 12 weeks or maintenance dosing for 12 weeks (if this  time line has passed we need documentation of maintenance dosing )  Last treatment:  No complications  MD visit scheduled Deanne Painter 5/14/24    Bertha ULX RN  [facility] Dermatology [place]  [phone]         Attestation signed by Stacey Welch APRN CNP at 3/25/2024  7:37 AM:  I agree with the information in this note.    SIMON Mathias CNP      Again, thank you for allowing me to participate in the care of your patient.        Sincerely,        SIMON Mathias CNP

## 2024-03-25 NOTE — PROGRESS NOTES
NB-UVB treatment for psoriasis(DX)  Areas being treated: entire body  Treatment # 8  Skin Type 1  Issues following previous treatment : initial  photoprotection on eyes, lips, nipples  Due to not being here for 10 days decreased by 25%  108 mj  min 13 seconds today .   Mineral oil applied by patient (by patient, staff, or none)  Goal 2-3 times weekly for a total of 12 weeks or maintenance dosing for 12 weeks (if this  time line has passed we need documentation of maintenance dosing )  Last treatment:  No complications  MD visit scheduled Deanne Painter 5/14/24    Bertha LUX RN  [facility] Dermatology [place]  [phone]

## 2024-03-27 ENCOUNTER — OFFICE VISIT (OUTPATIENT)
Dept: FAMILY MEDICINE | Facility: CLINIC | Age: 39
End: 2024-03-27
Payer: COMMERCIAL

## 2024-03-27 DIAGNOSIS — L40.9 PSORIASIS: Primary | ICD-10-CM

## 2024-03-27 PROCEDURE — 96900 ACTINOTHERAPY UV LIGHT: CPT | Performed by: NURSE PRACTITIONER

## 2024-03-27 NOTE — LETTER
3/27/2024         RE: Juan Burleson  [address]  [place] [address]        Dear Colleague,    Thank you for referring your patient, Juan Burleson, to the [facility]. Please see a copy of my visit note below.    NB-UVB treatment for psoriasis(DX)  Areas being treated: entire body  Treatment # 9  Skin Type 1  Issues following previous treatment : initial  photoprotection on eyes, lips, nipples  Due to not being here for 10 days decreased by 25%  126 mj  min 15 seconds today .   Mineral oil applied by patient (by patient, staff, or none)  Goal 2-3 times weekly for a total of 12 weeks or maintenance dosing for 12 weeks (if this  time line has passed we need documentation of maintenance dosing )  Last treatment:  No complications  MD visit scheduled Deanne Painter 5/14/24    Bertha LUX RN  [facility] Dermatology [place]  [phone]         Attestation signed by Stacey Welch APRN CNP at 3/27/2024  9:30 AM:  I agree with the information in this note.    SIMON Mathias CNP, CNP       Again, thank you for allowing me to participate in the care of your patient.        Sincerely,        SIMON Mathias CNP

## 2024-03-27 NOTE — PROGRESS NOTES
NB-UVB treatment for psoriasis(DX)  Areas being treated: entire body  Treatment # 9  Skin Type 1  Issues following previous treatment : initial  photoprotection on eyes, lips, nipples  Due to not being here for 10 days decreased by 25%  126 mj  min 15 seconds today .   Mineral oil applied by patient (by patient, staff, or none)  Goal 2-3 times weekly for a total of 12 weeks or maintenance dosing for 12 weeks (if this  time line has passed we need documentation of maintenance dosing )  Last treatment:  No complications  MD visit scheduled Deanne Painter 5/14/24    Bertha LUX RN  [facility] Dermatology [place]  [phone]

## 2024-04-10 ENCOUNTER — OFFICE VISIT (OUTPATIENT)
Dept: FAMILY MEDICINE | Facility: CLINIC | Age: 39
End: 2024-04-10
Payer: COMMERCIAL

## 2024-04-10 DIAGNOSIS — L40.9 PSORIASIS: Primary | ICD-10-CM

## 2024-04-10 PROCEDURE — 96900 ACTINOTHERAPY UV LIGHT: CPT | Performed by: NURSE PRACTITIONER

## 2024-04-10 NOTE — LETTER
4/10/2024         RE: Juan Burleson  [address]  [place] [address]        Dear Colleague,    Thank you for referring your patient, Juan Burleson, to the [facility]. Please see a copy of my visit note below.    NB-UVB treatment for psoriasis(DX)  Areas being treated: entire body  Treatment # 10  Skin Type 1  Issues following previous treatment : initial  photoprotection on eyes, lips, nipples  Due to not being here for 10 days decreased by 25%  101 mj  min 11 seconds today .   Mineral oil applied by patient (by patient, staff, or none)  Goal 2-3 times weekly for a total of 12 weeks or maintenance dosing for 12 weeks (if this  time line has passed we need documentation of maintenance dosing )  Last treatment:  No complications  MD visit scheduled Deanne Painter 5/14/2   MARISSA CRUM MA    Attestation signed by Stacey Welch APRN CNP at 4/10/2024  7:44 AM:  I agree with the information in this note.    SIMON Mathias CNP, CNP       Again, thank you for allowing me to participate in the care of your patient.        Sincerely,        SIMON Mathias CNP

## 2024-04-10 NOTE — PROGRESS NOTES
NB-UVB treatment for psoriasis(DX)  Areas being treated: entire body  Treatment # 10  Skin Type 1  Issues following previous treatment : initial  photoprotection on eyes, lips, nipples  Due to not being here for 10 days decreased by 25%  101 mj  min 11 seconds today .   Mineral oil applied by patient (by patient, staff, or none)  Goal 2-3 times weekly for a total of 12 weeks or maintenance dosing for 12 weeks (if this  time line has passed we need documentation of maintenance dosing )  Last treatment:  No complications  MD visit scheduled Deanne Painter 5/14/2   MARISSA CRUM MA

## 2024-04-15 ENCOUNTER — OFFICE VISIT (OUTPATIENT)
Dept: FAMILY MEDICINE | Facility: CLINIC | Age: 39
End: 2024-04-15
Payer: COMMERCIAL

## 2024-04-15 DIAGNOSIS — L40.9 PSORIASIS: Primary | ICD-10-CM

## 2024-04-15 PROCEDURE — 96900 ACTINOTHERAPY UV LIGHT: CPT | Performed by: PHYSICIAN ASSISTANT

## 2024-04-15 NOTE — LETTER
4/15/2024         RE: Juan Burleson  [address]  [place] [address]        Dear Colleague,    Thank you for referring your patient, Juan Burleson, to the [facility] [place]. Please see a copy of my visit note below.    NB-UVB treatment for psoriasis(DX)  Areas being treated: entire body  Treatment # 11  Skin Type 1  Issues following previous treatment : none  photoprotection on eyes, lips, nipples    107 mj  min 12seconds today .   Mineral oil applied by patient -yes    Goal 2-3 times weekly for a total of 12 weeks or maintenance dosing for 12 weeks   Last treatment:  No complications  MD visit scheduled Deanne Painter 5/14/24    Thank you,    Layne ALCANTAR RN BSN  [facility] Dermatology- [phone]      Dorie Hanna PA-C, [facility]  [facility]: Phone: [phone], Fax: [phone]  [facility]: Phone: [phone],  Fax: [phone]  [facility]: Phone: [phone], Fax: [phone]              Again, thank you for allowing me to participate in the care of your patient.        Sincerely,        Dorie Hanna PA-C

## 2024-04-15 NOTE — PROGRESS NOTES
NB-UVB treatment for psoriasis(DX)  Areas being treated: entire body  Treatment # 11  Skin Type 1  Issues following previous treatment : none  photoprotection on eyes, lips, nipples    107 mj  min 12seconds today .   Mineral oil applied by patient -yes    Goal 2-3 times weekly for a total of 12 weeks or maintenance dosing for 12 weeks   Last treatment:  No complications  MD visit scheduled Deanne Painter 5/14/24    Thank you,    Layne ALCANTAR RN BSN  [facility] Dermatology- [phone]      Dorie Hanna PA-C, MPAS  [facility]: Phone: [phone], Fax: [phone]  [facility]: Phone: [phone],  Fax: [phone]  [facility]: Phone: [phone], Fax: [phone]

## 2024-04-17 ENCOUNTER — OFFICE VISIT (OUTPATIENT)
Dept: FAMILY MEDICINE | Facility: CLINIC | Age: 39
End: 2024-04-17
Payer: COMMERCIAL

## 2024-04-17 DIAGNOSIS — L40.9 PSORIASIS: Primary | ICD-10-CM

## 2024-04-17 PROCEDURE — 96900 ACTINOTHERAPY UV LIGHT: CPT | Performed by: NURSE PRACTITIONER

## 2024-04-17 NOTE — PROGRESS NOTES
NB-UVB treatment for psoriasis(DX)  Areas being treated: entire body  Treatment # 12  Skin Type 1  Issues following previous treatment : none  photoprotection on eyes, lips, nipples     117 mj 0 min 12seconds today .   Mineral oil applied by patient -yes     Goal 2-3 times weekly for a total of 12 weeks or maintenance dosing for 12 weeks   Last treatment:  No complications  MD visit scheduled Deanne Painter 5/14/24    MARISSA CRUM MA

## 2024-04-17 NOTE — LETTER
4/17/2024         RE: Juan Burleson  [address]  [place] [address]        Dear Colleague,    Thank you for referring your patient, Juan Burleson, to the [facility]. Please see a copy of my visit note below.    NB-UVB treatment for psoriasis(DX)  Areas being treated: entire body  Treatment # 12  Skin Type 1  Issues following previous treatment : none  photoprotection on eyes, lips, nipples     117 mj 0 min 12seconds today .   Mineral oil applied by patient -yes     Goal 2-3 times weekly for a total of 12 weeks or maintenance dosing for 12 weeks   Last treatment:  No complications  MD visit scheduled Deanne Painter 5/14/24    MARISSA CRUM MA    Attestation signed by Stacey Welch APRN CNP at 4/17/2024 12:13 PM:  I agree with the information in this note.    SIMON Mathias CNP, CNP       Again, thank you for allowing me to participate in the care of your patient.        Sincerely,        SIMON Mathias CNP

## 2024-04-24 ENCOUNTER — OFFICE VISIT (OUTPATIENT)
Dept: FAMILY MEDICINE | Facility: CLINIC | Age: 39
End: 2024-04-24
Payer: COMMERCIAL

## 2024-04-24 DIAGNOSIS — L40.9 PSORIASIS: Primary | ICD-10-CM

## 2024-04-24 PROCEDURE — 96900 ACTINOTHERAPY UV LIGHT: CPT | Performed by: NURSE PRACTITIONER

## 2024-04-24 NOTE — PROGRESS NOTES
NB-UVB treatment for psoriasis(DX)  Areas being treated: entire body  Treatment # 13  Skin Type 1  Issues following previous treatment : none  photoprotection on eyes, lips, nipples     134 mj 0 min 14 seconds today .   Mineral oil applied by patient -yes     Goal 2-3 times weekly for a total of 12 weeks or maintenance dosing for 12 weeks   Last treatment:  No complications  MD visit scheduled Deanne Painter 5/14/24    Bertha LUX RN  [facility] Dermatology [place]  [phone]

## 2024-04-24 NOTE — LETTER
4/24/2024         RE: Juan Burleson  [address]  [place] [address]        Dear Colleague,    [facility]. Please see a copy of my visit note below.    NB-UVB treatment for psoriasis(DX)  Areas being treated: entire body  Treatment # 13  Skin Type 1  Issues following previous treatment : none  photoprotection on eyes, lips, nipples     134 mj 0 min 14 seconds today .   Mineral oil applied by patient -yes     Goal 2-3 times weekly for a total of 12 weeks or maintenance dosing for 12 weeks   Last treatment:  No complications  MD visit scheduled Deanne Painter 5/14/24    Bertha LUX RN  [facility] Dermatology [place]  [phone]      Attestation signed by Stacey Welch APRN CNP at 4/24/2024  8:20 AM:  I agree with the information in this note.    SIMON Mathias CNP, CNP       Again, thank you for allowing me to participate in the care of your patient.        Sincerely,        SIMON Mathias CNP

## 2024-04-25 ENCOUNTER — OFFICE VISIT (OUTPATIENT)
Dept: FAMILY MEDICINE | Facility: CLINIC | Age: 39
End: 2024-04-25
Payer: COMMERCIAL

## 2024-04-25 DIAGNOSIS — L40.9 PSORIASIS: Primary | ICD-10-CM

## 2024-04-25 PROCEDURE — 96900 ACTINOTHERAPY UV LIGHT: CPT | Performed by: PHYSICIAN ASSISTANT

## 2024-04-25 NOTE — LETTER
4/25/2024         RE: Juan Burleson  [address]  [place] [address]        Dear Colleague,    Thank you for referring your patient, Juan Burleson, to the [facility] [place]. Please see a copy of my visit note below.    NB-UVB treatment for psoriasis(DX)  Areas being treated: entire body  Treatment # 14  Skin Type 1  Issues following previous treatment : none  photoprotection on eyes, lips, nipples     151 mj 0 min 16 seconds today .   Mineral oil applied by patient -yes     Goal 2-3 times weekly for a total of 12 weeks or maintenance dosing for 12 weeks   Last treatment:  No complications  MD visit scheduled Deanne Painter 5/14/24      Dorie Hanna PA-C, [facility]  [facility]: Phone: [phone], Fax: [phone]  [facility]: Phone: [phone],  Fax: [phone]  [facility]: Phone: [phone], Fax: [phone]                 Again, thank you for allowing me to participate in the care of your patient.        Sincerely,        Dorie Hanna PA-C

## 2024-04-25 NOTE — PROGRESS NOTES
NB-UVB treatment for psoriasis(DX)  Areas being treated: entire body  Treatment # 14  Skin Type 1  Issues following previous treatment : none  photoprotection on eyes, lips, nipples     151 mj 0 min 16 seconds today .   Mineral oil applied by patient -yes     Goal 2-3 times weekly for a total of 12 weeks or maintenance dosing for 12 weeks   Last treatment:  No complications  MD visit scheduled Deanne Painter 5/14/24      Dorie Hanna PA-C, MPAS  [facility] [place]: Phone: [phone], Fax: [phone]  [facility]: Phone: [phone],  Fax: [phone]  [facility]: Phone: [phone], Fax: [phone]

## 2024-04-29 ENCOUNTER — OFFICE VISIT (OUTPATIENT)
Dept: FAMILY MEDICINE | Facility: CLINIC | Age: 39
End: 2024-04-29
Payer: COMMERCIAL

## 2024-04-29 DIAGNOSIS — L40.9 PSORIASIS: Primary | ICD-10-CM

## 2024-04-29 PROCEDURE — 96900 ACTINOTHERAPY UV LIGHT: CPT | Performed by: PHYSICIAN ASSISTANT

## 2024-04-29 NOTE — LETTER
4/29/2024         RE: Juan Burleson  [address]  [place] [address]        Dear Colleague,    Thank you for referring your patient, Juan Burleson, to the [facility] [place]. Please see a copy of my visit note below.    NB-UVB treatment for psoriasis(DX)  Areas being treated: entire body  Treatment # 15  Skin Type 1  Issues following previous treatment : none  photoprotection on eyes, lips, nipples     170 mj 0 min 18 seconds today .   Mineral oil applied by patient -yes     Goal 2-3 times weekly for a total of 12 weeks or maintenance dosing for 12 weeks   Last treatment:  No complications  MD visit scheduled Deanne Painter 5/14/24        Dorie Hanna PA-C, [facility]  [facility]: Phone: [phone], Fax: [phone]  [facility]: Phone: [phone],  Fax: [phone]  [facility]: Phone: [phone], Fax: [phone]                 Again, thank you for allowing me to participate in the care of your patient.        Sincerely,        Dorie Hanna PA-C

## 2024-04-29 NOTE — PROGRESS NOTES
NB-UVB treatment for psoriasis(DX)  Areas being treated: entire body  Treatment # 15  Skin Type 1  Issues following previous treatment : none  photoprotection on eyes, lips, nipples     170 mj 0 min 18 seconds today .   Mineral oil applied by patient -yes     Goal 2-3 times weekly for a total of 12 weeks or maintenance dosing for 12 weeks   Last treatment:  No complications  MD visit scheduled Deanne Painter 5/14/24        Dorie Hanna PA-C, MPAS  [facility] [place]: Phone: [phone], Fax: [phone]  [facility]: Phone: [phone],  Fax: [phone]  [facility]: Phone: [phone], Fax: [phone]

## 2024-05-01 ENCOUNTER — OFFICE VISIT (OUTPATIENT)
Dept: FAMILY MEDICINE | Facility: CLINIC | Age: 39
End: 2024-05-01
Payer: COMMERCIAL

## 2024-05-01 DIAGNOSIS — L40.9 PSORIASIS: Primary | ICD-10-CM

## 2024-05-01 PROCEDURE — 96900 ACTINOTHERAPY UV LIGHT: CPT | Performed by: NURSE PRACTITIONER

## 2024-05-01 NOTE — PROGRESS NOTES
NB-UVB treatment for psoriasis(DX)  Areas being treated: entire body  Treatment # 16  Skin Type 1  Issues following previous treatment : none  photoprotection on eyes, lips, nipples     188 mj 0 min 19 seconds today .   Mineral oil applied by patient -yes     Goal 2-3 times weekly for a total of 12 weeks or maintenance dosing for 12 weeks   Last treatment:  No complications  MD visit scheduled Deanne Painter 5/14/24

## 2024-05-01 NOTE — LETTER
5/1/2024         RE: Juan Burleson  [address]  [place] [address]        Dear Colleague,    Thank you for referring your patient, Juan Burleson, to the [facility]. Please see a copy of my visit note below.    NB-UVB treatment for psoriasis(DX)  Areas being treated: entire body  Treatment # 16  Skin Type 1  Issues following previous treatment : none  photoprotection on eyes, lips, nipples     188 mj 0 min 19 seconds today .   Mineral oil applied by patient -yes     Goal 2-3 times weekly for a total of 12 weeks or maintenance dosing for 12 weeks   Last treatment:  No complications  MD visit scheduled Deanne Painter 5/14/24    Attestation signed by Stacey Welch APRN CNP at 5/1/2024  7:36 AM:  I agree with the information in this note.    SIMON Mathias CNP, CNP       Again, thank you for allowing me to participate in the care of your patient.        Sincerely,        SIMON Mathias CNP

## 2024-05-02 NOTE — TELEPHONE ENCOUNTER
KonnectAgain message sent    Lizeth LEYVA RN   [facility] Triage       
05-May-2024

## 2024-05-06 ENCOUNTER — OFFICE VISIT (OUTPATIENT)
Dept: FAMILY MEDICINE | Facility: CLINIC | Age: 39
End: 2024-05-06
Payer: COMMERCIAL

## 2024-05-06 DIAGNOSIS — L40.9 PSORIASIS: Primary | ICD-10-CM

## 2024-05-06 PROCEDURE — 96900 ACTINOTHERAPY UV LIGHT: CPT | Performed by: PHYSICIAN ASSISTANT

## 2024-05-06 NOTE — LETTER
5/6/2024         RE: Juan Burleson  [address]  [place] [address]        Dear Colleague,    Thank you for referring your patient, Juan Burleson, to the [facility] [place]. Please see a copy of my visit note below.    NB-UVB treatment for psoriasis(DX)  Areas being treated: entire body  Treatment # 17  Skin Type 1  Issues following previous treatment : none  photoprotection on eyes, lips, nipples     198 mj 0 min 21 seconds today .   Mineral oil applied by patient -yes     Goal 2-3 times weekly for a total of 12 weeks or maintenance dosing for 12 weeks   Last treatment:  No complications  MD visit scheduled Deanne Painter 5/14/24    HILL PULLIAM PA-C, [facility]  [facility]: Phone: [phone], Fax: [phone]  [facility]: Phone: [phone],  Fax: [phone]  [facility] [place]: Phone: [phone], Fax: [phone]            Again, thank you for allowing me to participate in the care of your patient.        Sincerely,        Dorie Hanna PA-C

## 2024-05-06 NOTE — PROGRESS NOTES
NB-UVB treatment for psoriasis(DX)  Areas being treated: entire body  Treatment # 17  Skin Type 1  Issues following previous treatment : none  photoprotection on eyes, lips, nipples     198 mj 0 min 21 seconds today .   Mineral oil applied by patient -yes     Goal 2-3 times weekly for a total of 12 weeks or maintenance dosing for 12 weeks   Last treatment:  No complications  MD visit scheduled Deanne Painter 5/14/24    HILL PULLIAM PA-C, MPAS  [facility] [place]: Phone: [phone], Fax: [phone]  [facility]: Phone: [phone],  Fax: [phone]  [facility]: Phone: [phone], Fax: [phone]

## 2024-05-08 ENCOUNTER — OFFICE VISIT (OUTPATIENT)
Dept: FAMILY MEDICINE | Facility: CLINIC | Age: 39
End: 2024-05-08
Payer: COMMERCIAL

## 2024-05-08 DIAGNOSIS — L40.9 PSORIASIS: Primary | ICD-10-CM

## 2024-05-08 PROCEDURE — 96900 ACTINOTHERAPY UV LIGHT: CPT | Performed by: NURSE PRACTITIONER

## 2024-05-08 NOTE — PROGRESS NOTES
NB-UVB treatment for psoriasis(DX)  Areas being treated: entire body  Treatment # 18Skin Type 1  Issues following previous treatment : none  photoprotection on eyes, lips, nipples     214 mj 0 min 22 seconds today .   Mineral oil applied by patient -yes     Goal 2-3 times weekly for a total of 12 weeks or maintenance dosing for 12 weeks   Last treatment:  No complications  MD visit scheduled Deanne Painter 5/14/24     MARISSA CRUM MA

## 2024-05-08 NOTE — LETTER
5/8/2024         RE: Juan Burleson  [address]  [place] [address]        Dear Colleague,    Thank you for referring your patient, Juan Burleson, to the [facility]. Please see a copy of my visit note below.    NB-UVB treatment for psoriasis(DX)  Areas being treated: entire body  Treatment # 18Skin Type 1  Issues following previous treatment : none  photoprotection on eyes, lips, nipples     214 mj 0 min 22 seconds today .   Mineral oil applied by patient -yes     Goal 2-3 times weekly for a total of 12 weeks or maintenance dosing for 12 weeks   Last treatment:  No complications  MD visit scheduled Deanne Painter 5/14/24     MARISSA CRUM MA    Attestation signed by Stacey Welch APRN CNP at 5/8/2024  7:27 AM:  I agree with the information in this note.    SIMON Mathias CNP, CNP       Again, thank you for allowing me to participate in the care of your patient.        Sincerely,        SIMON Mathias CNP

## 2024-05-13 ENCOUNTER — OFFICE VISIT (OUTPATIENT)
Dept: FAMILY MEDICINE | Facility: CLINIC | Age: 39
End: 2024-05-13
Payer: COMMERCIAL

## 2024-05-13 DIAGNOSIS — L40.9 PSORIASIS: Primary | ICD-10-CM

## 2024-05-13 PROCEDURE — 96900 ACTINOTHERAPY UV LIGHT: CPT | Performed by: PHYSICIAN ASSISTANT

## 2024-05-13 NOTE — LETTER
5/13/2024         RE: Juan Burleson  [address]  [place] [address]        Dear Colleague,    Thank you for referring your patient, Juan Burleson, to the [facility] [place]. Please see a copy of my visit note below.    NB-UVB treatment for psoriasis(DX)  Areas being treated: entire body  Treatment # 19  Skin Type 1  Issues following previous treatment : none  photoprotection on eyes, lips, nipples     222 mj  0 min 23 seconds today .   Mineral oil applied by patient -yes     Goal 2-3 times weekly for a total of 12 weeks or maintenance dosing for 12 weeks   Last treatment:  No complications  MD visit scheduled Deanne Painter 5/14/24    Bertha LUX RN  [facility] [place]  [phone]    Dorie Hanna PA-C, [facility]  [facility]: Phone: [phone], Fax: [phone]  [facility]: Phone: [phone],  Fax: [phone]  [facility]: Phone: [phone], Fax: [phone]                   Again, thank you for allowing me to participate in the care of your patient.        Sincerely,        Dorie Hanna PA-C

## 2024-05-13 NOTE — PROGRESS NOTES
NB-UVB treatment for psoriasis(DX)  Areas being treated: entire body  Treatment # 19  Skin Type 1  Issues following previous treatment : none  photoprotection on eyes, lips, nipples     222 mj  0 min 23 seconds today .   Mineral oil applied by patient -yes     Goal 2-3 times weekly for a total of 12 weeks or maintenance dosing for 12 weeks   Last treatment:  No complications  MD visit scheduled Daenne Painter 5/14/24    Bertha LUX RN  [facility] [place]  [phone]    Dorie Hanna PA-C, MPAS  [facility] [place]: Phone: [phone], Fax: [phone]  [facility]: Phone: [phone],  Fax: [phone]  [facility]: Phone: [phone], Fax: [phone]

## 2024-05-14 ENCOUNTER — OFFICE VISIT (OUTPATIENT)
Dept: FAMILY MEDICINE | Facility: CLINIC | Age: 39
End: 2024-05-14
Payer: COMMERCIAL

## 2024-05-14 DIAGNOSIS — L40.9 PSORIASIS: ICD-10-CM

## 2024-05-14 PROCEDURE — 99214 OFFICE O/P EST MOD 30 MIN: CPT | Performed by: PHYSICIAN ASSISTANT

## 2024-05-14 RX ORDER — LISDEXAMFETAMINE DIMESYLATE 70 MG/1
70 CAPSULE ORAL EVERY MORNING
COMMUNITY
Start: 2024-05-13

## 2024-05-14 RX ORDER — OMEGA-3/DHA/EPA/FISH OIL 60 MG-90MG
600 CAPSULE ORAL DAILY
COMMUNITY

## 2024-05-14 RX ORDER — CALCIUM CARBONATE/VITAMIN D3 600 MG-10
2 TABLET ORAL DAILY
COMMUNITY
End: 2024-05-17

## 2024-05-14 ASSESSMENT — PAIN SCALES - GENERAL: PAINLEVEL: NO PAIN (0)

## 2024-05-14 NOTE — PATIENT INSTRUCTIONS
Patient Education       Proper skin care from [facility]:    -Eliminate harsh soaps as they strip the natural oils from the skin, often resulting in dry itchy skin ( i.e. Dial, Zest, Bengali Spring)  -Use mild soaps such as Cetaphil or Dove Sensitive Skin in the shower. You do not need to use soap on arms, legs, and trunk every time you shower unless visibly soiled.   -Avoid hot or cold showers.  -After showering, lightly dry off and apply moisturizing within 2-3 minutes. This will help trap moisture in the skin.   -Aggressive use of a moisturizer at least 1-2 times a day to the entire body (including -Vanicream, Cetaphil, Aquaphor or Cerave) and moisturize hands after every washing.  -We recommend using moisturizers that come in a tub that needs to be scooped out, not a pump. This has more of an oil base. It will hold moisture in your skin much better than a water base moisturizer. The above recommended are non-pore clogging.      Wear a sunscreen with at least SPF 30 on your face, ears, neck and V of the chest daily. Wear sunscreen on other areas of the body if those areas are exposed to the sun throughout the day. Sunscreens can contain physical and/or chemical blockers. Physical blockers are less likely to clog pores, these include zinc oxide and titanium dioxide. Reapply every two hour and after swimming.     Sunscreen examples: https://www.ewg.org/sunscreen/    UV radiation  UVA radiation remains constant throughout the day and throughout the year. It is a longer wavelength than UVB and therefore penetrates deeper into the skin leading to immediate and delayed tanning, photoaging, and skin cancer. 70-80% of UVA and UVB radiation occurs between the hours of 10am-2pm.  UVB radiation  UVB radiation causes the most harmful effects and is more significant during the summer months. However, snow and ice can reflect UVB radiation leading to skin damage during the winter months as well. UVB radiation is  responsible for tanning, burning, inflammation, delayed erythema (pinkness), pigmentation (brown spots), and skin cancer.     I recommend self monthly full body exams and yearly full body exams with a dermatology provider. If you develop a new or changing lesion please follow up for examination. Most skin cancers are pink and scaly or pink and pearly. However, we do see blue/brown/black skin cancers.  Consider the ABCDEs of melanoma when giving yourself your monthly full body exam ( don't forget the groin, buttocks, feet, toes, etc). A-asymmetry, B-borders, C-color, D-diameter, E-elevation or evolving. If you see any of these changes please follow up in clinic. If you cannot see your back I recommend purchasing a hand held mirror to use with a larger wall mirror.       Checking for Skin Cancer  You can find cancer early by checking your skin each month. There are 3 kinds of skin cancer. They are melanoma, basal cell carcinoma, and squamous cell carcinoma. Doing monthly skin checks is the best way to find new marks or skin changes. Follow the instructions below for checking your skin.   The ABCDEs of checking moles for melanoma   Check your moles or growths for signs of melanoma using ABCDE:   Asymmetry: the sides of the mole or growth don t match  Border: the edges are ragged, notched, or blurred  Color: the color within the mole or growth varies  Diameter: the mole or growth is larger than 6 mm (size of a pencil eraser)  Evolving: the size, shape, or color of the mole or growth is changing (evolving is not shown in the images below)    Checking for other types of skin cancer  Basal cell carcinoma or squamous cell carcinoma have symptoms such as:     A spot or mole that looks different from all other marks on your skin  Changes in how an area feels, such as itching, tenderness, or pain  Changes in the skin's surface, such as oozing, bleeding, or scaliness  A sore that does not heal  New swelling or redness beyond  the border of a mole    Who s at risk?  Anyone can get skin cancer. But you are at greater risk if you have:   Fair skin, light-colored hair, or light-colored eyes  Many moles or abnormal moles on your skin  A history of sunburns from sunlight or tanning beds  A family history of skin cancer  A history of exposure to radiation or chemicals  A weakened immune system  If you have had skin cancer in the past, you are at risk for recurring skin cancer.   How to check your skin  Do your monthly skin checkups in front of a full-length mirror. Check all parts of your body, including your:   Head (ears, face, neck, and scalp)  Torso (front, back, and sides)  Arms (tops, undersides, upper, and lower armpits)  Hands (palms, backs, and fingers, including under the nails)  Buttocks and genitals  Legs (front, back, and sides)  Feet (tops, soles, toes, including under the nails, and between toes)  If you have a lot of moles, take digital photos of them each month. Make sure to take photos both up close and from a distance. These can help you see if any moles change over time.   Most skin changes are not cancer. But if you see any changes in your skin, call your doctor right away. Only he or she can diagnose a problem. If you have skin cancer, seeing your doctor can be the first step toward getting the treatment that could save your life.   [employer] last reviewed this educational content on 4/1/2019 2000-2020 [employer]. [address] [place] [address], [place] [address]. All rights reserved. This information is not intended as a substitute for professional medical care. Always follow your healthcare professional's instructions.       When should I call my doctor?  If you are worsening or not improving, please, contact us or seek urgent care as noted below.     Who should I call with questions (adults)?  [facility] (adult and pediatric): [phone]  [facility]  [place] (adult): [phone]  [facility] ([place], [place], [place] and [place]) [phone]  For urgent needs outside of business hours call the [facility] at [phone] and ask for the dermatology resident on call to be paged  If this is a medical emergency and you are unable to reach an ER, Call 911      If you need a prescription refill, please contact your pharmacy. Refills are approved or denied by our Physicians during normal business hours, Monday through Fridays  Per office policy, refills will not be granted if you have not been seen within the past year (or sooner depending on your child's condition)

## 2024-05-14 NOTE — PROGRESS NOTES
[facility] Dermatology Note  Encounter Date: May 14, 2024  Office Visit     Dermatology Problem List:  1. Chronic plaque psoriasis  - Current tx: calcipotriene 0.005%, halobetasol 0.05%,  Apremilast 30 mg  - Previous tx: clobetasol 0.05%, nbUVB    ____________________________________________    Assessment & Plan:    # Plaque Psoriasis, without evidence of psoriatic arthritis or nail involvement. Discussed risks/benefits of systemic intervention vs topicals.  - Stop nbUVB treatments   - continue halobetasol (ULTRAVATE) 0.05 % ointment, apply topically 2 times daily to rashes x 2 weeks then weekends, then bid on weekends and repeat as needed.   - Continue calcipotriene 0.005% external ointment bid.   - Discussed with patient risks and benefits of Apremilast. Discussed risks including GI upset, nausea, vomiting, diarrhea, depression, weight loss, headaches, and URI. The patient denies a history of depression, current changes in mood or suicidal ideation. The patient will contact clinic if they experience any changes in mood. We obtained a baseline weight and will monitor weight closely. Medication list was reviewed for drug interactions(including CYP-450 inducers) and none were noted. The patient is not pregnant or breastfeeding. The patient was instructed not to crush, chew or split the tablet and that the tab may be taken with or without food. Dosing will be as follows: Day 1: 10 mg in morning, Day 2: 10 mg in morning and 10 mg in evening, Day 3: 10 mg in morning and 20 mg in evening, Day 4: 20 mg in morning and 20 mg in evening, Day 5: 20 mg in morning and 30 mg in evening, Day 6 and thereafter: 30 mg twice daily. Handout on medication provided to patient.       Procedures Performed:   None    Follow-up: 3 month(s) in-person, or earlier for new or changing lesions    Staff and Scribe:   Scribe Disclosure:   By signing my name below, I, Stephanie Lozoya, attest that this documentation has been  prepared under the direction and in the presence of Deanne Painter PA-C.  - Electronically Signed: Stephanie Lozoya 05/14/24       Provider Disclosure:  I agree with above History, Review of Systems, Physical exam and Plan.  I have reviewed the content of the documentation and have edited it as needed. I have personally performed the services documented here and the documentation accurately represents those services and the decisions I have made.      Electronically signed by:  All risks, benefits and alternatives were discussed with patient.  Patient is in agreement and understands the assessment and plan.  All questions were answered.  Sun Screen Education was given.   Return to Clinic in 3 months or sooner as needed.   Deanne Painter PA-C      ____________________________________________    CC: Psoriasis (3  onth return )    HPI:  Mr. Juan Burleson is a(n) 38 year old male who presents today as a new patient for psoriasis.     Patient reports there has not been much change to his symptoms. During his two weeks without his topicals, he noted some change, but ultimately notes no adverse effects. He is open to changing the frequency of use to see if there is any significant improvement. He recently completed his last clobetasol course (2 weeks) two days ago. He is currently on antidepressants.    Patient is otherwise feeling well, without additional skin concerns.    Labs Reviewed:  CMP, Lipid Reflex from 07/24/2023  Creatinine 1.16    Physical exam:  Vitals: There were no vitals taken for this visit.  GEN: This is a well developed, well-nourished male in no acute distress, in a pleasant mood.    SKIN: Focused examination of the sun exposed areas including lower legs and feet was performed.  - well defined pink scaly plaques noted throughout most of lower legs and extensor forearms  - No other lesions of concern on areas examined.                 Medications:  Current Outpatient Medications   Medication Sig  Dispense Refill    buPROPion (WELLBUTRIN XL) 300 MG 24 hr tablet       calcipotriene (DOVONOX) 0.005 % external ointment Apply a thin film of ointment to the affected skin 1-2x daily. 120 g 1    clobetasol (TEMOVATE) 0.05 % external ointment APPLY TWICE DAILY FOR 2 WEEKS, THEN TWICE DAILY ON WEEKENDS FOR 2 WEEKS. REPEAT 60 g 11    cloNIDine (CATAPRES) 0.1 MG tablet TAKE 1 TABLET BY MOUTH AT BEDTIME FOR 7 DAYS. INCREASE TO 1 TABLET BY MOUTH 2 TIMES DAILY      DULoxetine (CYMBALTA) 60 MG capsule       halobetasol (ULTRAVATE) 0.05 % ointment Apply topically 2 times daily To rashes x 2 weeks then weekends, then bid on weekends and repeat as needed. 100 g 3    lisdexamfetamine (VYVANSE) 60 MG capsule       Multiple Vitamins-Minerals (MULTIVITAMIN ADULTS PO)        No current facility-administered medications for this visit.      Past Medical History:   Patient Active Problem List   Diagnosis    Lumbar radiculopathy - left lateral foot numbness    Major depression    Myopia    Psoriasis    Morbid obesity (H)    Hyperlipidemia LDL goal <100    ADHD (attention deficit hyperactivity disorder)    Current moderate episode of major depressive disorder without prior episode (H)     Past Medical History:   Diagnosis Date    Depressive disorder     started when I was a teen        LILY Foster, DO  [address] [place] [address] on close of this encounter.

## 2024-05-14 NOTE — LETTER
5/14/2024         RE: Juan Burleson  [address]  [place] [address]        Dear Colleague,    Thank you for referring your patient, Juan Burleson, to the [facility] [place]. Please see a copy of my visit note below.    [facility] Dermatology Note  Encounter Date: May 14, 2024  Office Visit     Dermatology Problem List:  1. Chronic plaque psoriasis  - Current tx: calcipotriene 0.005%, halobetasol 0.05%,  Apremilast 30 mg  - Previous tx: clobetasol 0.05%, nbUVB    ____________________________________________    Assessment & Plan:    # Plaque Psoriasis, without evidence of psoriatic arthritis or nail involvement. Discussed risks/benefits of systemic intervention vs topicals.  - Stop nbUVB treatments   - continue halobetasol (ULTRAVATE) 0.05 % ointment, apply topically 2 times daily to rashes x 2 weeks then weekends, then bid on weekends and repeat as needed.   - Continue calcipotriene 0.005% external ointment bid.   - Discussed with patient risks and benefits of Apremilast. Discussed risks including GI upset, nausea, vomiting, diarrhea, depression, weight loss, headaches, and URI. The patient denies a history of depression, current changes in mood or suicidal ideation. The patient will contact clinic if they experience any changes in mood. We obtained a baseline weight and will monitor weight closely. Medication list was reviewed for drug interactions(including CYP-450 inducers) and none were noted. The patient is not pregnant or breastfeeding. The patient was instructed not to crush, chew or split the tablet and that the tab may be taken with or without food. Dosing will be as follows: Day 1: 10 mg in morning, Day 2: 10 mg in morning and 10 mg in evening, Day 3: 10 mg in morning and 20 mg in evening, Day 4: 20 mg in morning and 20 mg in evening, Day 5: 20 mg in morning and 30 mg in evening, Day 6 and thereafter: 30 mg twice daily. Handout on medication provided to  patient.       Procedures Performed:   None    Follow-up: 3 month(s) in-person, or earlier for new or changing lesions    Staff and Scribe:   Scribe Disclosure:   By signing my name below, I, Stephanie Lozoya, attest that this documentation has been prepared under the direction and in the presence of Deanne Painter PA-C.  - Electronically Signed: Stephanie Lozoya 05/14/24       Provider Disclosure:  I agree with above History, Review of Systems, Physical exam and Plan.  I have reviewed the content of the documentation and have edited it as needed. I have personally performed the services documented here and the documentation accurately represents those services and the decisions I have made.      Electronically signed by:  All risks, benefits and alternatives were discussed with patient.  Patient is in agreement and understands the assessment and plan.  All questions were answered.  Sun Screen Education was given.   Return to Clinic in 3 months or sooner as needed.   Deanne Painter PA-C      ____________________________________________    CC: Psoriasis (3  onth return )    HPI:  Mr. Juan Burleson is a(n) 38 year old male who presents today as a new patient for psoriasis.     Patient reports there has not been much change to his symptoms. During his two weeks without his topicals, he noted some change, but ultimately notes no adverse effects. He is open to changing the frequency of use to see if there is any significant improvement. He recently completed his last clobetasol course (2 weeks) two days ago. He is currently on antidepressants.    Patient is otherwise feeling well, without additional skin concerns.    Labs Reviewed:  CMP, Lipid Reflex from 07/24/2023  Creatinine 1.16    Physical exam:  Vitals: There were no vitals taken for this visit.  GEN: This is a well developed, well-nourished male in no acute distress, in a pleasant mood.    SKIN: Focused examination of the sun exposed areas including lower legs  and feet was performed.  - well defined pink scaly plaques noted throughout most of lower legs and extensor forearms  - No other lesions of concern on areas examined.                 Medications:  Current Outpatient Medications   Medication Sig Dispense Refill     buPROPion (WELLBUTRIN XL) 300 MG 24 hr tablet        calcipotriene (DOVONOX) 0.005 % external ointment Apply a thin film of ointment to the affected skin 1-2x daily. 120 g 1     clobetasol (TEMOVATE) 0.05 % external ointment APPLY TWICE DAILY FOR 2 WEEKS, THEN TWICE DAILY ON WEEKENDS FOR 2 WEEKS. REPEAT 60 g 11     cloNIDine (CATAPRES) 0.1 MG tablet TAKE 1 TABLET BY MOUTH AT BEDTIME FOR 7 DAYS. INCREASE TO 1 TABLET BY MOUTH 2 TIMES DAILY       DULoxetine (CYMBALTA) 60 MG capsule        halobetasol (ULTRAVATE) 0.05 % ointment Apply topically 2 times daily To rashes x 2 weeks then weekends, then bid on weekends and repeat as needed. 100 g 3     lisdexamfetamine (VYVANSE) 60 MG capsule        Multiple Vitamins-Minerals (MULTIVITAMIN ADULTS PO)        No current facility-administered medications for this visit.      Past Medical History:   Patient Active Problem List   Diagnosis     Lumbar radiculopathy - left lateral foot numbness     Major depression     Myopia     Psoriasis     Morbid obesity (H)     Hyperlipidemia LDL goal <100     ADHD (attention deficit hyperactivity disorder)     Current moderate episode of major depressive disorder without prior episode (H)     Past Medical History:   Diagnosis Date     Depressive disorder     started when I was a teen        CC Kel Foster, DO  [address] [place] [address] on close of this encounter.      Again, thank you for allowing me to participate in the care of your patient.        Sincerely,        Deanne Painter PA-C

## 2024-05-15 ENCOUNTER — HOSPITAL ENCOUNTER (OUTPATIENT)
Dept: CT IMAGING | Facility: CLINIC | Age: 39
Discharge: HOME | End: 2024-05-15
Attending: FAMILY MEDICINE | Admitting: FAMILY MEDICINE
Payer: COMMERCIAL

## 2024-05-15 DIAGNOSIS — E78.5 HYPERLIPIDEMIA LDL GOAL <100: ICD-10-CM

## 2024-05-15 DIAGNOSIS — Z82.49 FAMILY HISTORY OF ISCHEMIC HEART DISEASE: ICD-10-CM

## 2024-05-15 PROCEDURE — 75571 CT HRT W/O DYE W/CA TEST: CPT | Mod: 26 | Performed by: INTERNAL MEDICINE

## 2024-05-15 PROCEDURE — 75571 CT HRT W/O DYE W/CA TEST: CPT

## 2024-05-17 ENCOUNTER — VIRTUAL VISIT (OUTPATIENT)
Dept: DERMATOLOGY | Facility: CLINIC | Age: 39
End: 2024-05-17
Attending: PHYSICIAN ASSISTANT
Payer: COMMERCIAL

## 2024-05-17 DIAGNOSIS — L40.9 PSORIASIS: Primary | ICD-10-CM

## 2024-05-17 DIAGNOSIS — Z71.89 ENCOUNTER FOR HERB AND VITAMIN SUPPLEMENT MANAGEMENT: ICD-10-CM

## 2024-05-17 DIAGNOSIS — F90.9 ATTENTION DEFICIT HYPERACTIVITY DISORDER (ADHD), UNSPECIFIED ADHD TYPE: ICD-10-CM

## 2024-05-17 NOTE — PROGRESS NOTES
Medication Therapy Management (MTM) Encounter    ASSESSMENT:                            Plaque Psoriasis: Uncontrolled with topicals and nbUVB.  Otezla prior Auth still pending.  He does not have his new insurance information, but I will reach out to him 6/1/2024, we likely will have to restart the PA and possibly change specialty pharmacy.  He anticipates starting the drug in June since he has travel coming up.  Today we reviewed mechanism of action, side effects, dosing, lab monitoring.  Will closely monitor his mood, however he is following with psych and is on medication.  Will recheck his creatinine at upcoming appointment with Deanne.  No drug interactions.  He does not eat breakfast, therefore will monitor GI side effects and if needed he will eat a little bit in the morning.    Mood/ADHD: Continue current regimen, we will closely monitor this after starting Otezla due to the risk of worsening depression.    Supplements: Okay to continue supplements.    PLAN:                            Otezla PA pending     Follow-up: MyChart pending Otezla coverage. June 1st follow up after insurance change    SUBJECTIVE/OBJECTIVE:                          Luis Felipe Burleson is a 38 year old male contacted via secure video for an initial visit. He was referred to me from MEHDI Larios.      Reason for visit: Start Otezla plaque psoriasis   Medication Adherence/Access: no issues reported. He was familiar with his medications. Notes that his insurance will change 6/1 (not sure the name yet)    Plaque Psoriasis:   - halobetasol (ULTRAVATE) 0.05 % ointment, apply topically 2 times daily to rashes x 2 weeks then weekends, then bid on weekends and repeat as needed   - calcipotriene 0.005% external ointment bid   Topicals over 10 years and no longer effective, even with changing topicals. With the 2 week breaks he would have worsening sx. nbUVB also would keep his Pso from getting worse, but did not improve symptoms. New job he will  "be traveling more.   Patient met with Deanne on 5/14/24. Was recommended to stop nbUVB, continue topicals, and start Otezla. PA pending.   Per Deanne, no evidence of psoriatic arthritis or nail involvement. Well defined pink scaly plaques noted throughout most of lower legs and extensor forearms   Creatinine   Date Value Ref Range Status   07/24/2023 1.16 0.67 - 1.17 mg/dL Final       Mood/ADHD:   - bupropion  mg daily AM   - Clonidine 0.1 mg twice daily  - Duloxetine 60 mg daily  - Vyvanse 70 mg daily  Reports [facility] with [facility]. Mood well controlled. Long history of depression that started 14 years ago when he injured his back. He was started on duloxetine which has helped his back pain significantly and he feels \"normal\" and pain free.     Supplements:  -Zinc mag before bed  -Fish oil   -Multivitamin     Today's Vitals: There were no vitals taken for this visit.    Allergies/ADRs: Reviewed in chart  Past Medical History: Reviewed in chart  Tobacco: He reports that he has never smoked. He has never used smokeless tobacco.  Alcohol: reviewed in chart    ----------------      I spent 17 minutes with this patient today. All changes were made via collaborative practice agreement with Deanne Painter. A copy of the visit note was provided to the patient's provider(s).    A summary of these recommendations was sent via [employer].    Mesha Segura, Pharm.D., [facility]   Medication Therapy Management Pharmacist   [facility]    Telemedicine Visit Details  Type of service:  Telephone visit  Start Time:  10:00 AM  End Time:  10:17 AM     Medication Therapy Recommendations  No medication therapy recommendations to display     "

## 2024-05-17 NOTE — Clinical Note
5/17/2024       RE: Juan Burleson  [address]  [place] [address]     Dear Colleague,    Thank you for referring your patient, Juan Burleson, to the [facility] [place] [facility]. Please see a copy of my visit note below.    Medication Therapy Management (MTM) Encounter    ASSESSMENT:                            ***:  ***      PLAN:                            ***    Follow-up: {followuptest2:817257}    SUBJECTIVE/OBJECTIVE:                          Luis Felipe Burleson is a 38 year old male { :642110} for {mtmvisit:938548}     Reason for visit: Start Otezla plaque psoriasis   Medication Adherence/Access: {fumedadherence:323758}    Plaque Psoriasis:   - halobetasol (ULTRAVATE) 0.05 % ointment, apply topically 2 times daily to rashes x 2 weeks then weekends, then bid on weekends and repeat as needed   - calcipotriene 0.005% external ointment bid   -Clobetasol 0.05% ointment? DONE   Topicals over 10 years and no longer effective, even with changing. Keping it from worsening uv. With the breaks it gets worse.   Patient met with Deanne on 5/14/24. Was previously on topicals and nbUVB treatments. Was recommended to stop nbUVB, continue topicals, and start Otezla. New job he will be traveling   No evidence of psoriatic arthritis or nail involvement   well defined pink scaly plaques noted throughout most of lower legs and extensor forearms   Creatinine   Date Value Ref Range Status   07/24/2023 1.16 0.67 - 1.17 mg/dL Final       Mood/ADHD:   - bupropion  mg daily AM   -Clonidine 0.1 mg nightly/twice daily?  -Duloxetine 60 mg?  -Vyvanse 70 mg daily    Supplements:  -Calcium and vitamin D?  Calcium magnesium zinc?    Zinc mag before bed  -Fish oil   -Multivitamin general    Today's Vitals: There were no vitals taken for this visit.    Allergies/ADRs: {1/2/3/4/5:693512}  Past Medical History: {1/2/3/4/5:936481}  Tobacco: He reports that he has  "never smoked. He has never used smokeless tobacco.  Alcohol: {ALCOHOL CONSUMPTION HX:758844}  {Social and Goals:840248}  ----------------  {DAVID?:402085}    I spent {mtm total time 3:279624} with this patient today. { :469595}. A copy of the visit note was provided to the patient's provider(s).    A summary of these recommendations {GIVEN/NOT GIVEN:533955}.    Mesha Segura, Pharm.D., BCACP   Medication Therapy Management Pharmacist   [facility] Dermatology    Telemedicine Visit Details  Type of service:  {telemedvisitmtm:079265::\"Telephone visit\"}  Start Time: {video/phone visit start time:152948}  End Time: {video/phone visit end time:152948}     Medication Therapy Recommendations  No medication therapy recommendations to display       Medication Therapy Management (MTM) Encounter    ASSESSMENT:                            Plaque Psoriasis: Uncontrolled with topicals and nbUVB.  Otezla prior Auth still pending.  He does not have his new insurance information, but I will reach out to him 6/1/2024, we likely will have to restart the PA and possibly change specialty pharmacy.  He anticipates starting the drug in June since he has travel coming up.  Today we reviewed mechanism of action, side effects, dosing, lab monitoring.  Will closely monitor his mood, however he is following with psych and is on medication.  Will recheck his creatinine at upcoming appointment with Deanne.  No drug interactions.  He does not eat breakfast, therefore will monitor GI side effects and if needed he will eat a little bit in the morning.    Mood/ADHD: Continue current regimen, we will closely monitor this after starting Otezla due to the risk of worsening depression.    Supplements: Okay to continue supplements.      PLAN:                            Otezla PA pending     Follow-up: MyChart pending Otezla coverage. June 1st follow up after insurance change    SUBJECTIVE/OBJECTIVE:                          Luis Felipe Burleson is a 38 year old " "male contacted via secure video for an initial visit. He was referred to me from MEHDI Larios.      Reason for visit: Start Otezla plaque psoriasis   Medication Adherence/Access: no issues reported. He was familiar with his medications. Notes that his insurance will change 6/1 (not sure the name yet)    Plaque Psoriasis:   - halobetasol (ULTRAVATE) 0.05 % ointment, apply topically 2 times daily to rashes x 2 weeks then weekends, then bid on weekends and repeat as needed   - calcipotriene 0.005% external ointment bid   Topicals over 10 years and no longer effective, even with changing topicals. With the 2 week breaks he would have worsening sx. nbUVB also would keep his Pso from getting worse, but did not improve symptoms. New job he will be traveling more.   Patient met with Deanne on 5/14/24. Was recommended to stop nbUVB, continue topicals, and start Otezla. PA pending.   Per Deanne, no evidence of psoriatic arthritis or nail involvement. Well defined pink scaly plaques noted throughout most of lower legs and extensor forearms   Creatinine   Date Value Ref Range Status   07/24/2023 1.16 0.67 - 1.17 mg/dL Final       Mood/ADHD:   - bupropion  mg daily AM   - Clonidine 0.1 mg twice daily  - Duloxetine 60 mg daily  - Vyvanse 70 mg daily  [facility]. Mood well controlled. Long history of depression that started 14 years ago when he injured his back. He was started on duloxetine which has helped his back pain significantly and he feels \"normal\" and pain free.     Supplements:  -Zinc mag before bed  -Fish oil   -Multivitamin     Today's Vitals: There were no vitals taken for this visit.    Allergies/ADRs: Reviewed in chart  Past Medical History: Reviewed in chart  Tobacco: He reports that he has never smoked. He has never used smokeless tobacco.  Alcohol: reviewed in chart    ----------------      I spent 17 minutes with this patient today. All changes were made via " collaborative practice agreement with Deanne Painter. A copy of the visit note was provided to the patient's provider(s).    A summary of these recommendations was sent via [employer].    Mesha Segura, Pharm.D., [facility]   Medication Therapy Management Pharmacist   [facility]at[facility]    Telemedicine Visit Details  Type of service:  Telephone visit  Start Time:  10:00 AM  End Time:  10:17 AM     Medication Therapy Recommendations  No medication therapy recommendations to display         Again, thank you for allowing me to participate in the care of your patient.      Sincerely,    Berta DEGROOTD

## 2024-05-19 DIAGNOSIS — R91.8 PULMONARY NODULES: Primary | ICD-10-CM

## 2024-06-26 ENCOUNTER — TELEPHONE (OUTPATIENT)
Dept: DERMATOLOGY | Facility: CLINIC | Age: 39
End: 2024-06-26
Payer: COMMERCIAL

## 2024-06-26 NOTE — TELEPHONE ENCOUNTER
PA Initiation    Medication: OTEZLA 10 & 20 & 30 MG PO TBPK  Insurance Company: [employer] ([facility]) - Phone [phone] Fax [phone]  Pharmacy Filling the Rx: [facility] - [place], [place] - [address]  Filling Pharmacy Phone:    Filling Pharmacy Fax:    Start Date: 6/26/2024      Key: [id number]

## 2024-06-28 ENCOUNTER — TELEPHONE (OUTPATIENT)
Dept: DERMATOLOGY | Facility: CLINIC | Age: 39
End: 2024-06-28
Payer: COMMERCIAL

## 2024-06-28 DIAGNOSIS — L40.9 PSORIASIS: ICD-10-CM

## 2024-06-28 NOTE — TELEPHONE ENCOUNTER
Prior Authorization Approval    Medication: OTEZLA 30 MG PO TABS  Authorization Effective Date: 6/26/2024  Authorization Expiration Date: 6/26/2025  Approved Dose/Quantity: 60 per 30 days  Reference #: Key: [id number]   Insurance Company: [employer] ([employer]) - Phone [phone] Fax [phone]  Expected CoPay: $ 2,862.44  CoPay Card Available:      Financial Assistance Needed: yes messaged patient  Which Pharmacy is filling the prescription: [facility] - [place], [place] - [address]  Pharmacy Notified: not yet  Patient Notified: yes

## 2024-06-28 NOTE — TELEPHONE ENCOUNTER
Canceled starter pack for Otezla. Based on [employer] message with Mesha Segura, appears patient completed starter pack without issues. Sent [employer] message to patient to confirm this. Will reorder if needed.     Pauline Mckeon RP

## 2024-06-28 NOTE — TELEPHONE ENCOUNTER
Resent Rx orders for Otezla starter pack & maintenance dose to [facility] as requested by pharmacy liaisons. Patient seen by Mesha Segura (KADEN) on 5/17/24. Will route instructions to patient via [employer] message.     Pauline Mckeon, PharmD  [facility] Pharmacist

## 2024-06-28 NOTE — TELEPHONE ENCOUNTER
Messaged patient to confirm where to send prescription and if they have enrolled in copay card. Will check back in a few days

## 2024-07-15 ENCOUNTER — LAB (OUTPATIENT)
Dept: LAB | Facility: CLINIC | Age: 39
End: 2024-07-15
Payer: COMMERCIAL

## 2024-07-15 DIAGNOSIS — Z13.1 SCREENING FOR DIABETES MELLITUS: Primary | ICD-10-CM

## 2024-07-15 DIAGNOSIS — E78.5 HYPERLIPIDEMIA LDL GOAL <100: ICD-10-CM

## 2024-07-15 PROCEDURE — 80053 COMPREHEN METABOLIC PANEL: CPT

## 2024-07-15 PROCEDURE — 80061 LIPID PANEL: CPT

## 2024-07-15 PROCEDURE — 36415 COLL VENOUS BLD VENIPUNCTURE: CPT

## 2024-07-16 LAB
ALBUMIN SERPL BCG-MCNC: 4.2 G/DL (ref 3.5–5.2)
ALP SERPL-CCNC: 94 U/L (ref 40–150)
ALT SERPL W P-5'-P-CCNC: 57 U/L (ref 0–70)
ANION GAP SERPL CALCULATED.3IONS-SCNC: 9 MMOL/L (ref 7–15)
AST SERPL W P-5'-P-CCNC: 47 U/L (ref 0–45)
BILIRUB SERPL-MCNC: 0.2 MG/DL
BUN SERPL-MCNC: 10.1 MG/DL (ref 6–20)
CALCIUM SERPL-MCNC: 9.4 MG/DL (ref 8.6–10)
CHLORIDE SERPL-SCNC: 104 MMOL/L (ref 98–107)
CHOLEST SERPL-MCNC: 236 MG/DL
CREAT SERPL-MCNC: 1.21 MG/DL (ref 0.67–1.17)
EGFRCR SERPLBLD CKD-EPI 2021: 78 ML/MIN/1.73M2
FASTING STATUS PATIENT QL REPORTED: YES
GLUCOSE SERPL-MCNC: 95 MG/DL (ref 70–99)
GLUCOSE SERPL-MCNC: 95 MG/DL (ref 70–99)
HCO3 SERPL-SCNC: 28 MMOL/L (ref 22–29)
HDLC SERPL-MCNC: 39 MG/DL
LDLC SERPL CALC-MCNC: 184 MG/DL
NONHDLC SERPL-MCNC: 197 MG/DL
POTASSIUM SERPL-SCNC: 4.3 MMOL/L (ref 3.4–5.3)
PROT SERPL-MCNC: 6.9 G/DL (ref 6.4–8.3)
SODIUM SERPL-SCNC: 141 MMOL/L (ref 135–145)
TRIGL SERPL-MCNC: 67 MG/DL

## 2024-08-15 NOTE — PROGRESS NOTES
[facility] Dermatology Note  Encounter Date: Aug 20, 2024  Office Visit     Dermatology Problem List:  1. Chronic plaque psoriasis  - Current tx: calcipotriene 0.005%, halobetasol 0.05%,  Apremilast 30 mg  - Previous tx: clobetasol 0.05%, nbUVB    ____________________________________________    Assessment & Plan:    # Plaque Psoriasis, without evidence of psoriatic arthritis or nail involvement. Improved today on Otezla. Stopped nvUVB.  - Continue halobetasol (ULTRAVATE) 0.05 % ointment, apply topically 2 times daily to rashes x 2 weeks then weekends, then bid on weekends and repeat as needed.   - Continue calcipotriene 0.005% external ointment BID. Has on hand.  - Continue Otezla/Apremilast 30 mg BID.  - Reviewed Creatinine from 7/15/24 [facility].        Procedures Performed:   none    Follow-up: 1 year(s) in-person, or earlier for new or changing lesions    Staff and Scribe:     Scribe Disclosure:   I, Brisa Okeefe, am serving as a scribe to document services personally performed by Deanne Painter PA-C based on data collection and the provider's statements to me.       Provider Disclosure:   The documentation recorded by the scribe accurately reflects the services I personally performed and the decisions made by me.    All risks, benefits and alternatives were discussed with patient.  Patient is in agreement and understands the assessment and plan.  All questions were answered.  Sun Screen Education was given.   Return to Clinic annually or sooner as needed.   Deanne Painter PA-C   [facility]    ____________________________________________    CC: Psoriasis (Follow up psoriasis, sx have improved)    HPI:  Mr. Juan Burleson is a(n) 38 year old male who presents today as a return patient for psoriasis.    Last seen in dermatology by me 5/14/24 for psoriasis. Stop nbUVB treatments. Topicals continued and started Otezla with MTM order.     Today, patient  reports significant improvement with Otezla. Still has some active areas. Is receiving Otezla through [facility] and also co pay assistance through Otezla .      Patient is otherwise feeling well, without additional skin concerns.    Labs Reviewed:  Creatinine from 7/15/24 CMP 1.21    Physical Exam:  Vitals: There were no vitals taken for this visit.  SKIN: Focused examination of sun exposed areas was performed.  - Rubina pink scaly plaques, legs, R dorsal foot.  - Significant improvement in thickness and erythema.  - No other lesions of concern on areas examined.                   Medications:  Current Outpatient Medications   Medication Sig Dispense Refill    apremilast (OTEZLA) 30 MG tablet Take 1 tablet (30 mg) by mouth 2 times daily 60 tablet 2    buPROPion (WELLBUTRIN XL) 300 MG 24 hr tablet Take 300 mg by mouth every morning      calcipotriene (DOVONOX) 0.005 % external ointment Apply a thin film of ointment to the affected skin 1-2x daily. 120 g 1    cloNIDine (CATAPRES) 0.1 MG tablet Take 0.1 mg by mouth 2 times daily      DULoxetine (CYMBALTA) 60 MG capsule Take 60 mg by mouth daily      fish oil-omega-3 fatty acids 500 MG capsule Take 600 mg by mouth daily      halobetasol (ULTRAVATE) 0.05 % ointment Apply topically 2 times daily To rashes x 2 weeks then weekends, then bid on weekends and repeat as needed. 100 g 3    lisdexamfetamine (VYVANSE) 70 MG capsule Take 70 mg by mouth every morning      Multiple Vitamins-Minerals (MULTIVITAMIN ADULTS PO) Take 1 tablet by mouth daily       No current facility-administered medications for this visit.      Past Medical History:   Patient Active Problem List   Diagnosis    Lumbar radiculopathy - left lateral foot numbness    Major depression    Myopia    Psoriasis    Morbid obesity (H)    Hyperlipidemia LDL goal <100    ADHD (attention deficit hyperactivity disorder)    Current moderate episode of major depressive disorder without prior  episode (H)     Past Medical History:   Diagnosis Date    Depressive disorder     started when I was a teen        CC Referred Self, MD  No address on file on close of this encounter.

## 2024-08-19 ENCOUNTER — HOSPITAL ENCOUNTER (OUTPATIENT)
Dept: CT IMAGING | Facility: CLINIC | Age: 39
Discharge: HOME | End: 2024-08-19
Attending: FAMILY MEDICINE | Admitting: FAMILY MEDICINE
Payer: COMMERCIAL

## 2024-08-19 DIAGNOSIS — R91.8 PULMONARY NODULES: ICD-10-CM

## 2024-08-19 PROCEDURE — 71250 CT THORAX DX C-: CPT

## 2024-08-20 ENCOUNTER — OFFICE VISIT (OUTPATIENT)
Dept: DERMATOLOGY | Facility: CLINIC | Age: 39
End: 2024-08-20
Payer: COMMERCIAL

## 2024-08-20 DIAGNOSIS — L40.9 PSORIASIS: Primary | ICD-10-CM

## 2024-08-20 PROCEDURE — 99213 OFFICE O/P EST LOW 20 MIN: CPT | Performed by: PHYSICIAN ASSISTANT

## 2024-08-20 NOTE — PATIENT INSTRUCTIONS
Proper skin care from [facility]:    -Eliminate harsh soaps as they strip the natural oils from the skin, often resulting in dry itchy skin ( i.e. Dial, Zest, [place]au[place] Spring)  -Use mild soaps such as Cetaphil or Dove Sensitive Skin in the shower. You do not need to use soap on arms, legs, and trunk every time you shower unless visibly soiled.   -Avoid hot or cold showers.  -After showering, lightly dry off and apply moisturizing within 2-3 minutes. This will help trap moisture in the skin.   -Aggressive use of a moisturizer at least 1-2 times a day to the entire body (including -Vanicream, Cetaphil, Aquaphor or Cerave) and moisturize hands after every washing.  -We recommend using moisturizers that come in a tub that needs to be scooped out, not a pump. This has more of an oil base. It will hold moisture in your skin much better than a water base moisturizer. The above recommended are non-pore clogging.      Wear a sunscreen with at least SPF 30 on your face, ears, neck and V of the chest daily. Wear sunscreen on other areas of the body if those areas are exposed to the sun throughout the day. Sunscreens can contain physical and/or chemical blockers. Physical blockers are less likely to clog pores, these include zinc oxide and titanium dioxide. Reapply every two hour and after swimming.     Sunscreen examples: https://www.ewg.org/sunscreen/    UV radiation  UVA radiation remains constant throughout the day and throughout the year. It is a longer wavelength than UVB and therefore penetrates deeper into the skin leading to immediate and delayed tanning, photoaging, and skin cancer. 70-80% of UVA and UVB radiation occurs between the hours of 10am-2pm.  UVB radiation  UVB radiation causes the most harmful effects and is more significant during the summer months. However, snow and ice can reflect UVB radiation leading to skin damage during the winter months as well. UVB radiation is responsible for tanning,  burning, inflammation, delayed erythema (pinkness), pigmentation (brown spots), and skin cancer.     I recommend self monthly full body exams and yearly full body exams with a dermatology provider. If you develop a new or changing lesion please follow up for examination. Most skin cancers are pink and scaly or pink and pearly. However, we do see blue/brown/black skin cancers.  Consider the ABCDEs of melanoma when giving yourself your monthly full body exam ( don't forget the groin, buttocks, feet, toes, etc). A-asymmetry, B-borders, C-color, D-diameter, E-elevation or evolving. If you see any of these changes please follow up in clinic. If you cannot see your back I recommend purchasing a hand held mirror to use with a larger wall mirror.       Checking for Skin Cancer  You can find cancer early by checking your skin each month. There are 3 kinds of skin cancer. They are melanoma, basal cell carcinoma, and squamous cell carcinoma. Doing monthly skin checks is the best way to find new marks or skin changes. Follow the instructions below for checking your skin.   The ABCDEs of checking moles for melanoma   Check your moles or growths for signs of melanoma using ABCDE:   Asymmetry: the sides of the mole or growth don t match  Border: the edges are ragged, notched, or blurred  Color: the color within the mole or growth varies  Diameter: the mole or growth is larger than 6 mm (size of a pencil eraser)  Evolving: the size, shape, or color of the mole or growth is changing (evolving is not shown in the images below)    Checking for other types of skin cancer  Basal cell carcinoma or squamous cell carcinoma have symptoms such as:     A spot or mole that looks different from all other marks on your skin  Changes in how an area feels, such as itching, tenderness, or pain  Changes in the skin's surface, such as oozing, bleeding, or scaliness  A sore that does not heal  New swelling or redness beyond the border of a  mole    Who s at risk?  Anyone can get skin cancer. But you are at greater risk if you have:   Fair skin, light-colored hair, or light-colored eyes  Many moles or abnormal moles on your skin  A history of sunburns from sunlight or tanning beds  A family history of skin cancer  A history of exposure to radiation or chemicals  A weakened immune system  If you have had skin cancer in the past, you are at risk for recurring skin cancer.   How to check your skin  Do your monthly skin checkups in front of a full-length mirror. Check all parts of your body, including your:   Head (ears, face, neck, and scalp)  Torso (front, back, and sides)  Arms (tops, undersides, upper, and lower armpits)  Hands (palms, backs, and fingers, including under the nails)  Buttocks and genitals  Legs (front, back, and sides)  Feet (tops, soles, toes, including under the nails, and between toes)  If you have a lot of moles, take digital photos of them each month. Make sure to take photos both up close and from a distance. These can help you see if any moles change over time.   Most skin changes are not cancer. But if you see any changes in your skin, call your doctor right away. Only he or she can diagnose a problem. If you have skin cancer, seeing your doctor can be the first step toward getting the treatment that could save your life.   [employer] last reviewed this educational content on 4/1/2019 2000-2020 [employer]. [address] [place] [address], [place] [address]. All rights reserved. This information is not intended as a substitute for professional medical care. Always follow your healthcare professional's instructions.       When should I call my doctor?  If you are worsening or not improving, please, contact us or seek urgent care as noted below.     Who should I call with questions (adults)?    [facility] [phone]  For urgent needs outside of business hours call the [facility] at  [phone] and ask for the dermatology resident on call to be paged  If this is a medical emergency and you are unable to reach an ER, Call 911      If you need a prescription refill, please contact your pharmacy. Refills are approved or denied by our Physicians during normal business hours, Monday through Fridays  Per office policy, refills will not be granted if you have not been seen within the past year (or sooner depending on your child's condition)

## 2024-08-20 NOTE — LETTER
8/20/2024      Juan Burleson  [address] [place] [address]      Dear Colleague,    Thank you for referring your patient, Juan Burleson, to the [facility]. Please see a copy of my visit note below.      [facility] Dermatology Note  Encounter Date: Aug 20, 2024  Office Visit     Dermatology Problem List:  1. Chronic plaque psoriasis  - Current tx: calcipotriene 0.005%, halobetasol 0.05%,  Apremilast 30 mg  - Previous tx: clobetasol 0.05%, nbUVB    ____________________________________________    Assessment & Plan:    # Plaque Psoriasis, without evidence of psoriatic arthritis or nail involvement. Improved today on Otezla. Stopped nvUVB.  - Continue halobetasol (ULTRAVATE) 0.05 % ointment, apply topically 2 times daily to rashes x 2 weeks then weekends, then bid on weekends and repeat as needed.   - Continue calcipotriene 0.005% external ointment BID. Has on hand.  - Continue Otezla/Apremilast 30 mg BID.  - Reviewed Creatinine from 7/15/24 [facility].        Procedures Performed:   none    Follow-up: 1 year(s) in-person, or earlier for new or changing lesions    Staff and Scribe:     Scribe Disclosure:   I, Brisa Okeefe, am serving as a scribe to document services personally performed by Deanne Painter PA-C based on data collection and the provider's statements to me.       Provider Disclosure:   The documentation recorded by the scribe accurately reflects the services I personally performed and the decisions made by me.    All risks, benefits and alternatives were discussed with patient.  Patient is in agreement and understands the assessment and plan.  All questions were answered.  Sun Screen Education was given.   Return to Clinic annually or sooner as needed.   Deanne Painter PA-C   [facility]    ____________________________________________    CC: Psoriasis (Follow up psoriasis, sx have improved)    HPI:  Mr. Juan STANLEY  Sarah Beth is a(n) 38 year old male who presents today as a return patient for psoriasis.    Last seen in dermatology by me 5/14/24 for psoriasis. Stop nbUVB treatments. Topicals continued and started Otezla with MTM order.     Today, patient reports significant improvement with Otezla. Still has some active areas. Is receiving Otezla through [facility] pharmacy and also co pay assistance through Otezla .      Patient is otherwise feeling well, without additional skin concerns.    Labs Reviewed:  Creatinine from 7/15/24 CMP 1.21    Physical Exam:  Vitals: There were no vitals taken for this visit.  SKIN: Focused examination of sun exposed areas was performed.  - Rubina pink scaly plaques, legs, R dorsal foot.  - Significant improvement in thickness and erythema.  - No other lesions of concern on areas examined.                   Medications:  Current Outpatient Medications   Medication Sig Dispense Refill     apremilast (OTEZLA) 30 MG tablet Take 1 tablet (30 mg) by mouth 2 times daily 60 tablet 2     buPROPion (WELLBUTRIN XL) 300 MG 24 hr tablet Take 300 mg by mouth every morning       calcipotriene (DOVONOX) 0.005 % external ointment Apply a thin film of ointment to the affected skin 1-2x daily. 120 g 1     cloNIDine (CATAPRES) 0.1 MG tablet Take 0.1 mg by mouth 2 times daily       DULoxetine (CYMBALTA) 60 MG capsule Take 60 mg by mouth daily       fish oil-omega-3 fatty acids 500 MG capsule Take 600 mg by mouth daily       halobetasol (ULTRAVATE) 0.05 % ointment Apply topically 2 times daily To rashes x 2 weeks then weekends, then bid on weekends and repeat as needed. 100 g 3     lisdexamfetamine (VYVANSE) 70 MG capsule Take 70 mg by mouth every morning       Multiple Vitamins-Minerals (MULTIVITAMIN ADULTS PO) Take 1 tablet by mouth daily       No current facility-administered medications for this visit.      Past Medical History:   Patient Active Problem List   Diagnosis     Lumbar radiculopathy -  left lateral foot numbness     Major depression     Myopia     Psoriasis     Morbid obesity (H)     Hyperlipidemia LDL goal <100     ADHD (attention deficit hyperactivity disorder)     Current moderate episode of major depressive disorder without prior episode (H)     Past Medical History:   Diagnosis Date     Depressive disorder     started when I was a teen        CC Referred Self, MD  No address on file on close of this encounter.      Again, thank you for allowing me to participate in the care of your patient.        Sincerely,        Deanne Painter PA-C

## 2024-08-21 DIAGNOSIS — L40.9 PSORIASIS: ICD-10-CM

## 2024-11-18 ENCOUNTER — MYC MEDICAL ADVICE (OUTPATIENT)
Dept: PHARMACY | Facility: CLINIC | Age: 39
End: 2024-11-18
Payer: COMMERCIAL

## 2025-02-04 NOTE — PROGRESS NOTES
Medication Therapy Management (MTM) Encounter    ASSESSMENT:                            Plaque Psoriasis: significant improvement since starting Otezla 30 mg twice daily. Current symptoms slightly worse due to the winter when he typically flares, but still much more manageable than prior to starting Otezla. Not needing topicals at all. Recommended patient to continue Otezla, no side effects.   PDC of 0.99 indicates no adherence concerns. No renal issues.   Medication/allergy list updated in Epic; no concerns for new drug interactions.   Patient's questions/concerns were addressed at this time.  Due for next MTM follow-up in 6 months (~8/5/25). Will ensure patient sets up follow up with derm as well.     PLAN:                            Continue Otezla 30 mg twice daily     Follow-up: 6 month MTM follow up    SUBJECTIVE/OBJECTIVE:                          Luis Felipe Burleson is a 39 year old male contacted via secure video for a follow up visit (last MTM appt 5/17/24). He was referred to me from MEHDI Larios.      Reason for visit: Otezla follow up  Medication Adherence/Access: no issues reported. He was familiar with his medications.     Plaque Psoriasis:   - Otezla 30 mg TWICE DAILY    PA approved 6/26/24 - 6/26/25  - halobetasol (ULTRAVATE) 0.05 % ointment PRN  - calcipotriene 0.005% external ointment PRN  Topicals over 10 years and no longer effective, even with changing topicals. With the 2 week breaks he would have worsening sx. nbUVB also would keep his Pso from getting worse, but did not improve symptoms. Patient met with Deanne on 5/14/24 and it was recommended to stop nbUVB, continue topicals, and start Otezla. First dose 6/11/24.   Reports since starting his skin has significantly improved. He tends to flare more in the winter, but his skin is much more manageable than it was in the past. He is not needing topicals at all.   Currently has a small spot on his left leg and elbows.   Per Deanne, no evidence of  psoriatic arthritis or nail involvement.   No side effects.     Creatinine   Date Value Ref Range Status   07/15/2024 1.21 (H) 0.67 - 1.17 mg/dL Final     PDC = 0.99    Dermatology Life Quality Index (DLQI)   Over the last week, how itchy, sore, painful or stinging has your skin been? A little (1 pt)   Over the last week, how much has your skin interfered with your ability to do activities of daily living (shopping, cleaning, working, studying, sports)? Not at all (0 pts)    Based on the patient's quality of life, please rate the patient's current disease severity: Mild (minimal effect on quality of life, minimal body surface area affected).       Today's Vitals: There were no vitals taken for this visit.    Allergies/ADRs: Reviewed in chart  Past Medical History: Reviewed in chart  Tobacco: He reports that he has never smoked. He has never used smokeless tobacco.  Alcohol: reviewed in chart    ----------------      I spent 5 minutes with this patient today. All changes were made via collaborative practice agreement with Deanne Painter. A copy of the visit note was provided to the patient's provider(s).    A summary of these recommendations was sent via [employer].    Mesha Segura, Pharm.D., BCACP   Medication Therapy Management Pharmacist   [facility]    Telemedicine Visit Details  The patient's medications can be safely assessed via a telemedicine encounter.  Type of service:  Video Conference via [employer]  Originating Location (pt. Location): Home    Distant Location (provider location):  Off-site  Start Time:  8:00 AM  End Time:  8:05 AM     Medication Therapy Recommendations  No medication therapy recommendations to display

## 2025-02-05 ENCOUNTER — VIRTUAL VISIT (OUTPATIENT)
Dept: PHARMACY | Facility: CLINIC | Age: 40
End: 2025-02-05
Attending: PHYSICIAN ASSISTANT
Payer: COMMERCIAL

## 2025-02-05 DIAGNOSIS — L40.9 PSORIASIS: Primary | ICD-10-CM

## 2025-02-16 ENCOUNTER — HEALTH MAINTENANCE LETTER (OUTPATIENT)
Age: 40
End: 2025-02-16

## 2025-04-17 ENCOUNTER — OFFICE VISIT (OUTPATIENT)
Dept: PODIATRY | Facility: CLINIC | Age: 40
End: 2025-04-17
Payer: COMMERCIAL

## 2025-04-17 ENCOUNTER — ANCILLARY PROCEDURE (OUTPATIENT)
Dept: GENERAL RADIOLOGY | Facility: CLINIC | Age: 40
End: 2025-04-17
Attending: PODIATRIST
Payer: COMMERCIAL

## 2025-04-17 DIAGNOSIS — M20.21 HALLUX RIGIDUS OF RIGHT FOOT: Primary | ICD-10-CM

## 2025-04-17 DIAGNOSIS — M21.41 PES PLANUS OF BOTH FEET: ICD-10-CM

## 2025-04-17 DIAGNOSIS — M20.21 HALLUX RIGIDUS OF RIGHT FOOT: ICD-10-CM

## 2025-04-17 DIAGNOSIS — M21.42 PES PLANUS OF BOTH FEET: ICD-10-CM

## 2025-04-17 DIAGNOSIS — M20.5X2 HALLUX LIMITUS, LEFT: ICD-10-CM

## 2025-04-17 NOTE — PATIENT INSTRUCTIONS
Thank you for choosing [facility] / Foot & Ankle Surgery!    DR. SMALLS'S CLINIC LOCATIONS:     [facility] TRIAGE LINE: [phone]   [address] [place] [address] APPOINTMENTS: [phone]   [place] [address] RADIOLOGY: [phone]   (Every other Tues - Wed - Fri PM) SET UP SURGERY: [phone]    PHYSICAL THERAPY: [phone]   [facility] BILLING QUESTIONS: [phone] [address] FAX: [phone]   [place] [address]    (Thurs & Fri AM)         FOREFOOT PAIN RECOMMENDATIONS    1) Please consider stiffer/ rigid - soled shoes as much as possible. These take stress off of the ball of your foot.    2) Avoid barefoot walking, walking in socks, flexible shoes, shoes without arch support, flip flops, etc.    3) It is a good idea to wear a supportive shoe at all times, including when at home.    4) Consider a quality over-the-counter arch support. These can be found at [facility].  If Dr. Smalls prescribed custom orthoses, please consider this option. Sometimes modifications to the orthotic can take stress of the painful joint.    6) Ice and anti inflammatories can be helpful, earlier on in the process.  Anti inflammatories are not good for you, if taken for a long period of time. Heat can also be therapeutic for some.        DEGENERATIVE ARTHRITIS OF THE BIG TOE JOINT   (hallux limitus/hallux rigidus)   Arthritis of the joint at the base of the big toe (metatarsophalangeal joint) has several causes. Usually it results from repetitive trauma to the joint, secondary to abnormal foot mechanics. Often it is hereditary. However, a one-time traumatic event can lead to arthritis. The condition doesn't improve with time, and even with treatment, can worsen. The cartilage wears out, joint surfaces are no longer smooth, bone rubs on bone, inflammation occurs with pain, and eventually bone spurs and loose fragments might develop.   The joint is often painful with activity, worse with  "flimsy shoes or walking barefoot, and it slowly progresses over time. A person might notice the toe \"locking up\" with walking. There often is an obvious, and irritating, bony bump on top of the foot. Shoes might be uncomfortable. In some people the pain is so bothersome that recreational activities sometimes even normal daily activities are difficult to perform.   The pain from this arthritis is likely a combination of joint jamming, cartilage loss and inflammation, and irritation from shoes rubbing on the bump. Sometimes other parts of the foot, leg, or back hurt from altering one's walk to compensate for the painful joint.     Ways to help a person live with the discomfort include wearing a good, supportive shoe with a rigid, rocker-type bottom. An example is a hiking boot. A rigid sole minimizes bending of the joint, and therefore, joint motion and pain. Shoes with a high toe box allow for less rubbing on the bump. Avoiding barefoot walking, sandals, flip-flops and slippers usually helps.   Sometimes an insert or orthotic provides symptom relief. This might make shoe fit more difficult. Pads over the bump and occasionally injections into the joint provide relief.   Surgery for this condition is aimed towards alleviating pain. It does not cure the arthritis nor does it guarantee better joint motion. Depending on the condition of the metatarsophalangeal joint, there are several surgiqal options:    1.  Cutting off the bony bump(s) and cleaning the joint    2.  Loosening the joint up by making cuts in the first metatarsal bone or the big toe bone and removing a small section of bone.    3.  Repositioning bone to minimize jamming of the joint.    4.  In severe cases, the joint is fused. By fusing the joint, it will never bend again. This resolves the pain, because it's the movement of a worn out joint that causes pain. Oftentimes the operation involves a combination of these procedures and. requires the use of " screws, pins, and/or a small surgical plate.     Healing after surgery requires about six weeks of protection. This allows the bone to heal. Maximum recovery takes about one year. The scar tissue and joint structures require this amount of time to finish the healing process. Expect stiffness, swelling and numbness during that time frame.   Surgery for arthritis of the metatarsophalangeal joint does involve side effects. Some side effects are predictable and others are less common but do occur. A scar will be visible and could be irritated by shoes. The shoe may rub on the screw or internal pin requiring surgical removal of these fixation devices. The screw and pin would likely be left in place for a full year. The first toe may remain stiff after surgery. The amount of stiffness is variable. Most people never regain normal motion of the first toe. This is due to scar tissue inherent to any surgery, in addition to the cumUlative effects of arthritis. Sometimes the big toe drifts to one side or the other. Joint fusion is one option to correct an unstable, drifting toe. This procedure straightens the toe, however, no motion remains.   All surgical procedures involve risk of infection, numbness, pain, delayed bone healing, osteotomy (bone cut) dislocation, blood clots, continued foot pain, etc. Arthritic joint surgery is quite complex and should not be taken lightly.    Any skin incision can lead to infection. Deep infection might involve the bone and thus repeat surgery and six weeks of IV antibiotics. Scar tissue can cause nerve pain or numbness. his is generally temporary but can be permanent. We do not have treatments that cure nerve problems. Second toe pain could be related to altered mechanics and pressure transferred to the second toe. Delayed bone healing would lengthen the healing time. Some bones simply do not heal. This requires repeat surgery, electronic bone stimulation and/or extended protection. Smokers  have an approximate 20% chance of poor bone healing. This is double that of a non-smoker. The bone cut may displace. This may need to be repaired with a second operation. Displacement can cause joint malalignment. Immobility after surgery can cause a blood clot in the legs and lungs. This could result in death.   Foot pain is complex. Most feet hurt for more than one reason. Operating on the arthritic   big toe joint will not necessarily create a pain free foot. Appropriate shoes, healthy body weight, avoidance of bare foot walking and moderation of activity will always be   necessary to enjoy foot comfort. Arthritis is incurable even with surgery.     Surgery for this type of arthritis is nevertheless quite successful. Most surgical patients are pleased with their foot following surgery. Many of the issues described above can be controlled by taking proper care of your foot during the healing process.   Cosmetic bump surgery is discouraged for the reasons listed above. A bump and joint that is comfortable when wearing appropriate shoes should simply be treated with appropriate shoes.   Your surgeon would be happy to fully describe any of the above issues. You should pursue a full understanding of the operation, recovery process and any potential problems that could develop.

## 2025-04-17 NOTE — PROGRESS NOTES
ASSESSMENT:  Encounter Diagnoses   Name Primary?    Hallux rigidus of right foot Yes    Hallux limitus, left     Pes planus of both feet      MEDICAL DECISION MAKING:  I personally reviewed the right foot x-ray images.  Advanced degenerative changes of the first metatarsophalangeal joint.  This includes dorsal spurring spurring, joint space narrowing, sclerosis.    Conservative recommendations reviewed:  Stiff soled shoes and/or use of the carbon fiber plates to splint the joint  Continue quality athletic shoes  Continue quality over-the-counter arch supports  Custom orthoses are an option  We discussed anti-inflammatory measures including ice, NSAIDs, Voltaren gel and steroid injection.  He understands the latter might mask the pain and does not repair of the joint.    Should conservative care is not adequately relieve his pain, he would be a candidate for joint fusion.  This was briefly discussed, including the postoperative course.    Disclaimer: This note consists of symbols derived from keyboarding, dictation and/or voice recognition software. As a result, there may be errors in the script that have gone undetected. Please consider this when interpreting information found in this chart.    Fernando Jimenez DPM, FACFAS, MS    [facility]/Foot & Ankle Surgery      ____________________________________________________________________    HPI:       Juan Burleson presents today reporting a bone spur at the first metatarsophalangeal joint of the right foot.  He reports having hypermobility and injuring this joint multiple times.  He experiences burning and throbbing pain.  Some degree of pain daily, yet reduced in recent days.  Treatment has involved rest, new insoles, carbon fiber plate, myofascial release/rolling,'s calf stretching  He also reports previous right ankle sprains.  Exercise activities include strength training 3 times a week, daily dog walking 3 times a day    *  Past Medical  History:   Diagnosis Date    Depressive disorder     started when I was a teen   *  *  Past Surgical History:   Procedure Laterality Date    ABDOMEN SURGERY  2011    Skin removal after weigth loss 2011    BACK SURGERY  12/2021    Laproscopic discecomy   *  *  Current Outpatient Medications   Medication Sig Dispense Refill    apremilast (OTEZLA) 30 MG tablet Take 1 tablet (30 mg) by mouth 2 times daily. 60 tablet 11    buPROPion (WELLBUTRIN XL) 300 MG 24 hr tablet Take 300 mg by mouth every morning      calcipotriene (DOVONOX) 0.005 % external ointment Apply a thin film of ointment to the affected skin 1-2x daily. 120 g 1    cloNIDine (CATAPRES) 0.1 MG tablet Take 0.1 mg by mouth 2 times daily      DULoxetine (CYMBALTA) 60 MG capsule Take 60 mg by mouth daily      fish oil-omega-3 fatty acids 500 MG capsule Take 600 mg by mouth daily      halobetasol (ULTRAVATE) 0.05 % ointment Apply topically 2 times daily To rashes x 2 weeks then weekends, then bid on weekends and repeat as needed. 100 g 3    lisdexamfetamine (VYVANSE) 70 MG capsule Take 70 mg by mouth every morning      Multiple Vitamins-Minerals (MULTIVITAMIN ADULTS PO) Take 1 tablet by mouth daily           EXAM:    Vitals: There were no vitals taken for this visit.  BMI: There is no height or weight on file to calculate BMI.  Vasc:      Pedal pulses are palpable for the dorsalis pedis posterior tibial artery, bilateral foot.  Capillary fill time </= 3 seconds  Pedal skin appears well-perfused  Neuro:      Light touch sensation intact to all sensory nerve distributions, bilateral foot.  No apparent spastic contractures or other deformity secondary to neurologic compromise.  Derm:      No calluses  No wounds   No worrisome lesions  MSK:      With loading of the right foot, no ability for the first metatarsal phalangeal joint to dorsiflex.  Significant bony widening around the joint.  Pain with end range of motion, plantarflexion  Bilateral lower extremity muscle  strength presents is normal.  No gross deformities  Adequate ankle and subtalar joint range of motion  Calf:    Neg for redness, swelling or tenderness    X-Ray Findings:  I personally reviewed the right foot images.  See comments above.

## 2025-04-17 NOTE — LETTER
4/17/2025      Juan Burleson  [address] [place] [address]      Dear Colleague,    Thank you for referring your patient, Juan Burleson, to the [facility]. Please see a copy of my visit note below.    ASSESSMENT:  Encounter Diagnoses   Name Primary?     Hallux rigidus of right foot Yes     Hallux limitus, left      Pes planus of both feet      MEDICAL DECISION MAKING:  I personally reviewed the right foot x-ray images.  Advanced degenerative changes of the first metatarsophalangeal joint.  This includes dorsal spurring spurring, joint space narrowing, sclerosis.    Conservative recommendations reviewed:  Stiff soled shoes and/or use of the carbon fiber plates to splint the joint  Continue quality athletic shoes  Continue quality over-the-counter arch supports  Custom orthoses are an option  We discussed anti-inflammatory measures including ice, NSAIDs, Voltaren gel and steroid injection.  He understands the latter might mask the pain and does not repair of the joint.    Should conservative care is not adequately relieve his pain, he would be a candidate for joint fusion.  This was briefly discussed, including the postoperative course.    Disclaimer: This note consists of symbols derived from keyboarding, dictation and/or voice recognition software. As a result, there may be errors in the script that have gone undetected. Please consider this when interpreting information found in this chart.    Fernando Jimenez DPM, FACFAS, [facility] Department of Podiatry/Foot & Ankle Surgery      ____________________________________________________________________    HPI:       Juan Burleson presents today reporting a bone spur at the first metatarsophalangeal joint of the right foot.  He reports having hypermobility and injuring this joint multiple times.  He experiences burning and throbbing pain.  Some degree of pain daily, yet reduced in recent days.  Treatment has involved rest,  new insoles, carbon fiber plate, myofascial release/rolling,'s calf stretching  He also reports previous right ankle sprains.  Exercise activities include strength training 3 times a week, daily dog walking 3 times a day    *  Past Medical History:   Diagnosis Date     Depressive disorder     started when I was a teen   *  *  Past Surgical History:   Procedure Laterality Date     ABDOMEN SURGERY  2011    Skin removal after weigth loss 2011     BACK SURGERY  12/2021    Laproscopic discecomy   *  *  Current Outpatient Medications   Medication Sig Dispense Refill     apremilast (OTEZLA) 30 MG tablet Take 1 tablet (30 mg) by mouth 2 times daily. 60 tablet 11     buPROPion (WELLBUTRIN XL) 300 MG 24 hr tablet Take 300 mg by mouth every morning       calcipotriene (DOVONOX) 0.005 % external ointment Apply a thin film of ointment to the affected skin 1-2x daily. 120 g 1     cloNIDine (CATAPRES) 0.1 MG tablet Take 0.1 mg by mouth 2 times daily       DULoxetine (CYMBALTA) 60 MG capsule Take 60 mg by mouth daily       fish oil-omega-3 fatty acids 500 MG capsule Take 600 mg by mouth daily       halobetasol (ULTRAVATE) 0.05 % ointment Apply topically 2 times daily To rashes x 2 weeks then weekends, then bid on weekends and repeat as needed. 100 g 3     lisdexamfetamine (VYVANSE) 70 MG capsule Take 70 mg by mouth every morning       Multiple Vitamins-Minerals (MULTIVITAMIN ADULTS PO) Take 1 tablet by mouth daily           EXAM:    Vitals: There were no vitals taken for this visit.  BMI: There is no height or weight on file to calculate BMI.  Vasc:      Pedal pulses are palpable for the dorsalis pedis posterior tibial artery, bilateral foot.  Capillary fill time </= 3 seconds  Pedal skin appears well-perfused  Neuro:      Light touch sensation intact to all sensory nerve distributions, bilateral foot.  No apparent spastic contractures or other deformity secondary to neurologic compromise.  Derm:      No calluses  No wounds   No  worrisome lesions  MSK:      With loading of the right foot, no ability for the first metatarsal phalangeal joint to dorsiflex.  Significant bony widening around the joint.  Pain with end range of motion, plantarflexion  Bilateral lower extremity muscle strength presents is normal.  No gross deformities  Adequate ankle and subtalar joint range of motion  Calf:    Neg for redness, swelling or tenderness    X-Ray Findings:  I personally reviewed the right foot images.  See comments above.          Again, thank you for allowing me to participate in the care of your patient.        Sincerely,        Fernando Jimenez DPM    Electronically signed

## 2025-06-17 ENCOUNTER — TELEPHONE (OUTPATIENT)
Dept: DERMATOLOGY | Facility: CLINIC | Age: 40
End: 2025-06-17
Payer: COMMERCIAL

## 2025-09-03 ENCOUNTER — VIRTUAL VISIT (OUTPATIENT)
Dept: PHARMACY | Facility: CLINIC | Age: 40
End: 2025-09-03
Attending: PHYSICIAN ASSISTANT
Payer: COMMERCIAL

## 2025-09-03 DIAGNOSIS — L40.9 PSORIASIS: ICD-10-CM
